# Patient Record
Sex: FEMALE | Race: WHITE | NOT HISPANIC OR LATINO | Employment: FULL TIME | URBAN - METROPOLITAN AREA
[De-identification: names, ages, dates, MRNs, and addresses within clinical notes are randomized per-mention and may not be internally consistent; named-entity substitution may affect disease eponyms.]

---

## 2019-08-05 ENCOUNTER — TRANSCRIBE ORDERS (OUTPATIENT)
Dept: ADMINISTRATIVE | Facility: HOSPITAL | Age: 59
End: 2019-08-05

## 2019-08-05 DIAGNOSIS — C48.0 MALIGNANT NEOPLASM OF RETROPERITONEUM (HCC): Primary | ICD-10-CM

## 2019-08-09 ENCOUNTER — APPOINTMENT (OUTPATIENT)
Dept: WOMENS IMAGING | Facility: HOSPITAL | Age: 59
End: 2019-08-09

## 2019-08-09 PROCEDURE — 77063 BREAST TOMOSYNTHESIS BI: CPT | Performed by: RADIOLOGY

## 2019-08-09 PROCEDURE — 77067 SCR MAMMO BI INCL CAD: CPT | Performed by: RADIOLOGY

## 2019-08-09 RX ORDER — CYCLOBENZAPRINE HCL 10 MG
10 TABLET ORAL 2 TIMES DAILY PRN
COMMUNITY
End: 2022-02-01 | Stop reason: ALTCHOICE

## 2019-08-09 RX ORDER — HYDROCHLOROTHIAZIDE 12.5 MG/1
12.5 TABLET ORAL DAILY
COMMUNITY
End: 2022-08-16 | Stop reason: SDUPTHER

## 2019-08-09 RX ORDER — TRAMADOL HYDROCHLORIDE 50 MG/1
50 TABLET ORAL EVERY 6 HOURS PRN
COMMUNITY
End: 2022-02-01 | Stop reason: ALTCHOICE

## 2019-08-09 RX ORDER — LISINOPRIL 40 MG/1
40 TABLET ORAL DAILY
COMMUNITY

## 2019-08-09 RX ORDER — ESTRADIOL 1 MG/1
1 TABLET ORAL DAILY
COMMUNITY
End: 2022-02-01 | Stop reason: DRUGHIGH

## 2019-08-12 ENCOUNTER — OFFICE VISIT (OUTPATIENT)
Dept: NEUROSURGERY | Facility: CLINIC | Age: 59
End: 2019-08-12

## 2019-08-12 VITALS
TEMPERATURE: 98.1 F | OXYGEN SATURATION: 95 % | WEIGHT: 251.6 LBS | HEART RATE: 73 BPM | HEIGHT: 65 IN | BODY MASS INDEX: 41.92 KG/M2

## 2019-08-12 DIAGNOSIS — M50.00 HNP (HERNIATED NUCLEUS PULPOSUS) WITH MYELOPATHY, CERVICAL: ICD-10-CM

## 2019-08-12 DIAGNOSIS — M48.061 SPINAL STENOSIS, LUMBAR REGION, WITHOUT NEUROGENIC CLAUDICATION: Primary | ICD-10-CM

## 2019-08-12 DIAGNOSIS — M43.10 ACQUIRED SPONDYLOLISTHESIS: ICD-10-CM

## 2019-08-12 PROCEDURE — 99243 OFF/OP CNSLTJ NEW/EST LOW 30: CPT | Performed by: NEUROLOGICAL SURGERY

## 2019-08-12 NOTE — PROGRESS NOTES
Subjective   Patient ID: Genie Lundberg is a 59 y.o. female is being seen for consultation today at the request of AIDEN Rodrigez  Chief Complaint: Neck and right arm pain, left lower back pain, left thigh numbness    History of Present Illness: The patient is a 59-year-old woman who is a  for  and has a history of the onset of right neck shoulder and arm pain extending to the right forearm on about 6 June.  That was treated with physical therapy and seemed to improve but then worsened again.  In the meantime she has had numbness of the left lateral thigh with a pricking or stinging kind of sensation, and she has had to sleep in a recliner for much of the past year because of this back discomfort which bothers her with standing and walking as well as with trying to recline or lie flat.  He denies help.  She is worsened by sitting in one place or standing in one place.    She was recently found to have a 15 cm infrarenal mass on the right side that is pending surgery.    Review of Radiographic Studies:  Lumbar MRI scan shows moderately severe stenosis L4-5 with facet joint widening on the right side and no gross spondylolisthesis but there is the probability of some nascent instability.  Cervical MRI scan shows a cervical foraminal disc herniation on the right at C4-5 with a moderate posterior disc osteophyte causing minimal stenosis, slight kyphotic angle with minimal stenosis in the canal at C5-6, but no evidence of right-sided foraminal stenosis.  Abdominal CT scan shows a large rounded right sided infrarenal mass elevating the kidney.    The following portions of the patient's history were reviewed, updated as appropriate and approved: allergies, current medications, past family history, past medical history, past social history, past surgical history, review of systems and problem list.  Review of Systems   Constitutional: Positive for activity change, appetite change and  fatigue.   Respiratory: Positive for apnea.    Musculoskeletal: Positive for arthralgias, back pain and neck pain.   Neurological: Positive for numbness.   All other systems reviewed and are negative.      Objective     NEUROLOGICAL EXAMINATION:      MENTAL STATUS:  Alert and oriented.  Speech intact.  Recent and remote memory intact.      CRANIAL NERVES:  Cranial nerve II:  Visual fields are full.  Cranial nerves III, IV and VI:  PERRLADC.  Extraocular movements are intact.  Nystagmus is not present.  Cranial nerve V:  Facial sensation is intact.  Cranial nerve VII:  Muscles of facial expression reveal no asymmetry.  Cranial nerve VIII:  Hearing is intact.  Cranial nerves IX and X:  Palate elevates symmetrically.  Cranial nerve XI:  Shoulder shrug is intact.  Cranial nerve XII:  Tongue is midline without evidence of atrophy or fasciculation.    MUSCULOSKELETAL: SLR negative bilaterally.    MOTOR: Intact strength in the upper and lower extremities without atrophy.    SENSATION: Numbness left lateral thigh    REFLEXES:  DTR trace biceps, triceps, patellar, and Achilles bilaterally.    Assessment   Foraminal stenosis C4-5 right with right C5 radiculopathy.  Mild stenosis and minimal kyphosis at C4-5 and C5-6.  Moderately severe stenosis L4-5 with probable early spondylolisthesis.       Plan   Physical therapy for the cervical and lumbar spine while she is having her abdominal infrarenal mass attended to surgically.  If symptoms persist after recovery from that surgery and adequate physical therapy, she should be seen again for discussion of injection therapies versus surgery.       Antonio Agudelo MD

## 2019-08-13 ENCOUNTER — APPOINTMENT (OUTPATIENT)
Dept: CT IMAGING | Facility: HOSPITAL | Age: 59
End: 2019-08-13

## 2020-10-06 ENCOUNTER — APPOINTMENT (OUTPATIENT)
Dept: WOMENS IMAGING | Facility: HOSPITAL | Age: 60
End: 2020-10-06

## 2020-10-06 PROCEDURE — 77067 SCR MAMMO BI INCL CAD: CPT | Performed by: RADIOLOGY

## 2020-10-06 PROCEDURE — 77063 BREAST TOMOSYNTHESIS BI: CPT | Performed by: RADIOLOGY

## 2021-11-15 ENCOUNTER — APPOINTMENT (OUTPATIENT)
Dept: WOMENS IMAGING | Facility: HOSPITAL | Age: 61
End: 2021-11-15

## 2021-11-15 PROCEDURE — 77067 SCR MAMMO BI INCL CAD: CPT | Performed by: RADIOLOGY

## 2021-11-15 PROCEDURE — 77063 BREAST TOMOSYNTHESIS BI: CPT | Performed by: RADIOLOGY

## 2022-02-01 ENCOUNTER — OFFICE VISIT (OUTPATIENT)
Dept: CARDIOLOGY | Facility: CLINIC | Age: 62
End: 2022-02-01

## 2022-02-01 VITALS
DIASTOLIC BLOOD PRESSURE: 80 MMHG | HEART RATE: 72 BPM | HEIGHT: 65 IN | TEMPERATURE: 98 F | OXYGEN SATURATION: 94 % | SYSTOLIC BLOOD PRESSURE: 118 MMHG | BODY MASS INDEX: 43.65 KG/M2 | WEIGHT: 262 LBS

## 2022-02-01 DIAGNOSIS — R09.02 HYPOXIA: Primary | ICD-10-CM

## 2022-02-01 DIAGNOSIS — E88.81 METABOLIC SYNDROME: ICD-10-CM

## 2022-02-01 DIAGNOSIS — I25.10 CORONARY ARTERY CALCIFICATION SEEN ON CAT SCAN: ICD-10-CM

## 2022-02-01 DIAGNOSIS — I25.6 SILENT MYOCARDIAL ISCHEMIA: ICD-10-CM

## 2022-02-01 DIAGNOSIS — G47.30 SLEEP APNEA IN ADULT: ICD-10-CM

## 2022-02-01 DIAGNOSIS — I10 PRIMARY HYPERTENSION: ICD-10-CM

## 2022-02-01 DIAGNOSIS — R53.83 OTHER FATIGUE: ICD-10-CM

## 2022-02-01 DIAGNOSIS — E78.00 HYPERCHOLESTEREMIA: ICD-10-CM

## 2022-02-01 DIAGNOSIS — C49.9 LIPOSARCOMA: ICD-10-CM

## 2022-02-01 PROBLEM — E88.810 METABOLIC SYNDROME: Status: ACTIVE | Noted: 2022-02-01

## 2022-02-01 PROCEDURE — 99204 OFFICE O/P NEW MOD 45 MIN: CPT | Performed by: INTERNAL MEDICINE

## 2022-02-01 PROCEDURE — 93000 ELECTROCARDIOGRAM COMPLETE: CPT | Performed by: INTERNAL MEDICINE

## 2022-02-01 RX ORDER — ERGOCALCIFEROL 1.25 MG/1
50000 CAPSULE ORAL WEEKLY
COMMUNITY

## 2022-02-01 RX ORDER — ESTRADIOL 0.5 MG/1
0.5 TABLET ORAL DAILY
COMMUNITY

## 2022-02-01 RX ORDER — ROSUVASTATIN CALCIUM 10 MG/1
10 TABLET, COATED ORAL DAILY
Qty: 30 TABLET | Refills: 11 | Status: SHIPPED | OUTPATIENT
Start: 2022-02-01 | End: 2022-12-19

## 2022-02-01 RX ORDER — CHLORAL HYDRATE 500 MG
CAPSULE ORAL
COMMUNITY

## 2022-02-01 RX ORDER — MULTIPLE VITAMINS W/ MINERALS TAB 9MG-400MCG
1 TAB ORAL DAILY
COMMUNITY

## 2022-02-01 RX ORDER — SENNOSIDES 8.6 MG
CAPSULE ORAL 4 TIMES DAILY PRN
COMMUNITY

## 2022-02-01 RX ORDER — ASPIRIN 81 MG/1
81 TABLET ORAL DAILY
COMMUNITY

## 2022-02-01 NOTE — PATIENT INSTRUCTIONS
Mediterranean Diet  A Mediterranean diet refers to food and lifestyle choices that are based on the traditions of countries located on the [place]. This way of eating has been shown to help prevent certain conditions and improve outcomes for people who have chronic diseases, like kidney disease and heart disease.  What are tips for following this plan?  Lifestyle  · Cook and eat meals together with your family, when possible.  · Drink enough fluid to keep your urine clear or pale yellow.  · Be physically active every day. This includes:  ? Aerobic exercise like running or swimming.  ? Leisure activities like gardening, walking, or housework.  · Get 7-8 hours of sleep each night.  · If recommended by your health care provider, drink red wine in moderation. This means 1 glass a day for nonpregnant women and 2 glasses a day for men. A glass of wine equals 5 oz (150 mL).  Reading food labels    · Check the serving size of packaged foods. For foods such as rice and pasta, the serving size refers to the amount of cooked product, not dry.  · Check the total fat in packaged foods. Avoid foods that have saturated fat or trans fats.  · Check the ingredients list for added sugars, such as corn syrup.    Shopping  · At the grocery store, buy most of your food from the areas near the walls of the store. This includes:  ? Fresh fruits and vegetables (produce).  ? Grains, beans, nuts, and seeds. Some of these may be available in unpackaged forms or large amounts (in bulk).  ? Fresh seafood.  ? Poultry and eggs.  ? Low-fat dairy products.  · Buy whole ingredients instead of prepackaged foods.  · Buy fresh fruits and vegetables in-season from local farmers markets.  · Buy frozen fruits and vegetables in resealable bags.  · If you do not have access to quality fresh seafood, buy precooked frozen shrimp or canned fish, such as tuna, salmon, or sardines.  · Buy small amounts of raw or cooked vegetables, salads, or olives from  the deli or salad bar at your store.  · Stock your pantry so you always have certain foods on hand, such as olive oil, canned tuna, canned tomatoes, rice, pasta, and beans.  Cooking  · Cook foods with extra-virgin olive oil instead of using butter or other vegetable oils.  · Have meat as a side dish, and have vegetables or grains as your main dish. This means having meat in small portions or adding small amounts of meat to foods like pasta or stew.  · Use beans or vegetables instead of meat in common dishes like chili or lasagna.  · [place] with different cooking methods. Try roasting or broiling vegetables instead of steaming or sautéeing them.  · Add frozen vegetables to soups, stews, pasta, or rice.  · Add nuts or seeds for added healthy fat at each meal. You can add these to yogurt, salads, or vegetable dishes.  · Marinate fish or vegetables using olive oil, lemon juice, garlic, and fresh herbs.  Meal planning    · Plan to eat 1 vegetarian meal one day each week. Try to work up to 2 vegetarian meals, if possible.  · Eat seafood 2 or more times a week.  · Have healthy snacks readily available, such as:  ? Vegetable sticks with hummus.  ? Greek yogurt.  ? Fruit and nut trail mix.  · Eat balanced meals throughout the week. This includes:  ? Fruit: 2-3 servings a day  ? Vegetables: 4-5 servings a day  ? Low-fat dairy: 2 servings a day  ? Fish, poultry, or lean meat: 1 serving a day  ? Beans and legumes: 2 or more servings a week  ? Nuts and seeds: 1-2 servings a day  ? Whole grains: 6-8 servings a day  ? Extra-virgin olive oil: 3-4 servings a day  · Limit red meat and sweets to only a few servings a month    What are my food choices?  · Mediterranean diet  ? Recommended  § Grains: Whole-grain pasta. Brown rice. Bulgar wheat. Polenta. Couscous. Whole-wheat bread. Oatmeal. Quinoa.  § Vegetables: Artichokes. Beets. Broccoli. Cabbage. Carrots. Eggplant. Green beans. Chard. Kale. Spinach. Onions. Leeks. Peas. Squash.  Tomatoes. Peppers. Radishes.  § Fruits: Apples. Apricots. Avocado. Berries. Bananas. Cherries. Dates. Figs. Grapes. Yolanda. Melon. Oranges. Peaches. Plums. Pomegranate.  § Meats and other protein foods: Beans. Almonds. Sunflower seeds. Pine nuts. Peanuts. Cod. Buna. Scallops. Shrimp. Tuna. Tilapia. Clams. Oysters. Eggs.  § Dairy: Low-fat milk. Cheese. Greek yogurt.  § Beverages: Water. Red wine. Herbal tea.  § Fats and oils: Extra virgin olive oil. Avocado oil. Grape seed oil.  § Sweets and desserts: Greek yogurt with honey. Baked apples. Poached pears. Trail mix.  § Seasoning and other foods: Basil. Cilantro. Coriander. Cumin. Mint. Parsley. [place]. Rosemary. Tarragon. Garlic. Oregano. Thyme. Pepper. Balsalmic vinegar. Tahini. Hummus. Tomato sauce. Olives. Mushrooms.  ? Limit these  § Grains: Prepackaged pasta or rice dishes. Prepackaged cereal with added sugar.  § Vegetables: Deep fried potatoes (french fries).  § Fruits: Fruit canned in syrup.  § Meats and other protein foods: Beef. Pork. Lamb. Poultry with skin. Hot dogs. [place]iles.  § Dairy: Ice cream. Sour cream. Whole milk.  § Beverages: Juice. Sugar-sweetened soft drinks. Beer. Liquor and spirits.  § Fats and oils: Butter. Canola oil. Vegetable oil. Beef fat (tallow). Lard.  § Sweets and desserts: Cookies. Cakes. Pies. Candy.  § Seasoning and other foods: Mayonnaise. Premade sauces and marinades.  The items listed may not be a complete list. Talk with your dietitian about what dietary choices are right for you.  Summary  · The Mediterranean diet includes both food and lifestyle choices.  · Eat a variety of fresh fruits and vegetables, beans, nuts, seeds, and whole grains.  · Limit the amount of red meat and sweets that you eat.  · Talk with your health care provider about whether it is safe for you to drink red wine in moderation. This means 1 glass a day for nonpregnant women and 2 glasses a day for men. A glass of wine equals 5 oz (150 mL).  This information  is not intended to replace advice given to you by your health care provider. Make sure you discuss any questions you have with your health care provider.  Document Revised: 08/17/2017 Document Reviewed: 08/10/2017  Elsevier Patient Education © 2020 [employer]

## 2022-02-01 NOTE — PROGRESS NOTES
Chief Complaint   Patient presents with   • Establish Care     PCP referred, O2 sat dropping down in the 80's   • Shortness of Breath     has felt SOB for the past several months, has always contributed to her weight and lack of exercise until she randomly started checking her pulse ox and it was in the low 80's. PCP added O2 at 2, sat still stayed around 89-91%.  O2 increased to 3L, will bring it up to 94-95%.   PCP ordered CT and chest Xray, CT in the door. No PFT or pulmonology consult yet.    • Labs     most recent labs from Nov in chart   • Cardiac history     cardiac testing 20+ years ago at your old office,  was told it was ok.         CARDIAC COMPLAINTS  dyspnea and fatigue      Subjective   Genie Lundberg is a 61 y.o. female came in today for her initial cardiac evaluation.  She has history of hypertension, metabolic syndrome has been noticing increasing shortness of breath for the last few months.  She also has been gaining weight.  She randomly check her pulse PO2 measurement and it has been running low at 80%.  She was put on 2 L oxygen and apparently the saturation went up to 91%.  One of her family members apparently is a respiratory therapist and they increase it to 3 L.  It is now better at around 95%.  She had a chest x-ray as well as a CT scan done.  According to the report the lungs appears to be hypoinflated.  Mild atelectatic changes seen.  She also has coronary artery atherosclerotic disease.  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  She has not been treated with chemotherapy or radiation.  She denies having any chest pain at this time.  She does not do much activities at this time her lab work showed normal thyroid function test.  Her A1c is upper limit of normal at 5.9.  Her blood count appears to be normal.  Her cholesterol is 180 with the LDL of 101.  She has no history of smoking or drinking alcohol.  She does have a family history of  heart disease.  Her brother had bypass surgery and a pacemaker placement.  Past Surgical History:   Procedure Laterality Date   • HYSTERECTOMY     • OTHER SURGICAL HISTORY  01/19/2022    CT Chest- Coronary artery calcification. Hypoinflated Lungs   • THYROIDECTOMY, PARTIAL      benign nodule removed       Current Outpatient Medications   Medication Sig Dispense Refill   • Acetaminophen (TYLENOL 8 HOUR ARTHRITIS PAIN PO) Take  by mouth 4 (Four) Times a Day As Needed.     • aspirin 81 MG EC tablet Take 81 mg by mouth Daily.     • Cyanocobalamin (VITAMIN B-12 IJ) Inject  as directed Every 30 (Thirty) Days.     • estradiol (ESTRACE) 0.5 MG tablet Take 0.5 mg by mouth Daily.     • hydrochlorothiazide (HYDRODIURIL) 12.5 MG tablet Take 12.5 mg by mouth Daily.     • lisinopril (PRINIVIL,ZESTRIL) 40 MG tablet Take 40 mg by mouth Daily.     • multivitamin with minerals (CENTRUM WOMEN PO) Take 1 tablet by mouth Daily.     • Omega-3 Fatty Acids (fish oil) 1000 MG capsule capsule Take  by mouth Daily With Breakfast.     • vitamin D (ERGOCALCIFEROL) 1.25 MG (23881 UT) capsule capsule Take 50,000 Units by mouth 1 (One) Time Per Week.     • rosuvastatin (CRESTOR) 10 MG tablet Take 1 tablet by mouth Daily. 30 tablet 11     No current facility-administered medications for this visit.           ALLERGIES:  Hydrocodone and Sulfa antibiotics    Past Medical History:   Diagnosis Date   • Cancer (HCC)     s/p surgery   • History of hysterectomy    • Hyperlipidemia     controlled with fish oil   • Hypertension    • Sleep apnea     compliant CPAP user   • Thyroid nodule     s/p removal, benign,1980   • Vitamin D deficiency        Social History     Tobacco Use   Smoking Status Never Smoker   Smokeless Tobacco Never Used          Family History   Problem Relation Age of Onset   • Hypertension Mother    • Diabetes Mother    • Thyroid disease Mother    • Arthritis Father    • Other Brother         PPM   • Heart disease Brother         CABG   •  "Hypertension Brother    • Diabetes Brother    • Stroke Maternal Grandmother    • Stroke Paternal Grandmother    • No Known Problems Daughter    • No Known Problems Daughter        Review of Systems   Constitutional: Positive for malaise/fatigue. Negative for decreased appetite.   HENT: Negative for congestion and sore throat.    Eyes: Negative for blurred vision.   Cardiovascular: Positive for dyspnea on exertion and palpitations. Negative for chest pain.   Respiratory: Positive for shortness of breath and snoring.    Endocrine: Negative for cold intolerance and heat intolerance.   Hematologic/Lymphatic: Negative for adenopathy. Does not bruise/bleed easily.   Skin: Negative for itching, nail changes and skin cancer.   Musculoskeletal: Negative for arthritis and myalgias.   Gastrointestinal: Negative for abdominal pain, dysphagia and heartburn.   Genitourinary: Negative for bladder incontinence and frequency.   Neurological: Negative for dizziness, light-headedness, seizures and vertigo.   Psychiatric/Behavioral: Negative for altered mental status.   Allergic/Immunologic: Negative for environmental allergies and hives.       Diabetes- No  Thyroid- normal    Objective     /80 (BP Location: Left arm)   Pulse 72   Temp 98 °F (36.7 °C)   Ht 165.1 cm (65\")   Wt 119 kg (262 lb)   SpO2 94% Comment: on room air  BMI 43.60 kg/m²     Vitals and nursing note reviewed.   Constitutional:       Appearance: Healthy appearance. Not in distress.   Eyes:      Conjunctiva/sclera: Conjunctivae normal.      Pupils: Pupils are equal, round, and reactive to light.   HENT:      Head: Normocephalic.   Pulmonary:      Effort: Pulmonary effort is normal.      Breath sounds: Examination of the right-middle field reveals decreased breath sounds. Examination of the left-middle field reveals decreased breath sounds. Examination of the right-lower field reveals decreased breath sounds. Examination of the left-lower field reveals " decreased breath sounds. Decreased breath sounds present.   Cardiovascular:      PMI at left midclavicular line. Normal rate. Regular rhythm.      Murmurs: There is a systolic murmur.   Abdominal:      General: Bowel sounds are normal.      Palpations: Abdomen is soft.   Musculoskeletal: Normal range of motion.      Cervical back: Normal range of motion and neck supple. Skin:     General: Skin is warm and dry.   Neurological:      Mental Status: Alert, oriented to person, place, and time and oriented to person, place and time.           ECG 12 Lead    Date/Time: 2/1/2022 1:34 PM  Performed by: Marcus Mcadams MD  Authorized by: Marcus Mcadams MD   Previous ECG: no previous ECG available  Rhythm: sinus rhythm  Rate: normal  Q waves: V6, V5 and aVL    QRS axis: normal  Other findings: low voltage    Clinical impression: abnormal EKG              Assessment/Plan   Patient's Body mass index is 43.6 kg/m². indicating that she is morbidly obese (BMI > 40 or > 35 with obesity - related health condition). Obesity-related health conditions include the following: obstructive sleep apnea, hypertension, coronary heart disease and dyslipidemias. Obesity is newly identified. BMI is is above average; BMI management plan is completed. We discussed low calorie, low carb based diet program, portion control and increasing exercise..     Diagnoses and all orders for this visit:    1. Hypoxia (Primary)  -     Adult Transthoracic Echo Complete W/ Cont if Necessary Per Protocol; Future    2. Primary hypertension    3. Other fatigue    4. Hypercholesteremia  -     rosuvastatin (CRESTOR) 10 MG tablet; Take 1 tablet by mouth Daily.  Dispense: 30 tablet; Refill: 11    5. Sleep apnea in adult  -     Overnight Sleep Oximetry Study; Future    6. Liposarcoma (HCC)    7. Coronary artery calcification seen on CAT scan  -     Stress Test With Myocardial Perfusion One Day; Future  -     Adult Transthoracic Echo Complete W/ Cont if  Necessary Per Protocol; Future    8. Silent myocardial ischemia  -     Stress Test With Myocardial Perfusion One Day; Future    9. Metabolic syndrome    At baseline her heart rate is stable.  Her blood pressure is normal.  Her EKG showed sinus rhythm with Q waves in the lateral leads.  Her clinical examination reveals a BMI of 44.  She does have a slightly loud second heart sound and a short systolic murmur at the mitral area.    Regarding the hypoxia, I checked O2 saturation while she was here at the office it appears to be around 94 without any supplemental oxygen.  It seems most of her hypoxia occurs at nighttime.  I scheduled her to undergo an echocardiogram to evaluate the LV function and also do a bubble study to rule out a shunt causing some of the hypoxia.    Regarding her symptoms of feeling tired and fatigue, it could be secondary to sleep apnea.  She does use a CPAP but it has not been checked for a long time.  Like to check her nocturnal pulse PO2 measurement and if there is evidence of hypoxia, she may need to see a pulmonologist and may need to titrate her CPAP or change to BiPAP.    Regarding her hypercholesterolemia, given the fact that she does have coronary artery calcification, the likelihood of CAD is high.  At this time I started her on Crestor at 10 mg once a day.  I scheduled her to undergo a stress test in the form of Lexiscan to rule out silent ischemia.    Regarding her history of liposarcoma, she has undergone surgery and apparently has been followed couple of times with the surgeon.  He feels that it is taken care of so we will continue to monitor it    Regarding the metabolic syndrome, I talked to her about diet and weight reduction.  I gave her papers on Mediterranean diet.  I also talked to her about intermittent fasting diet.    Based on the results, further recommendations will be made.               Electronically signed by Marcus Mcadams MD February 1, 2022 13:33 EST

## 2022-02-07 NOTE — PROGRESS NOTES
Pt aware of results of overnight, she has already discussed with PCP, she said she did get up in the night and take off her pulse ox when her O2 was at 84%. PCP is considering repeating.

## 2022-03-08 ENCOUNTER — HOSPITAL ENCOUNTER (OUTPATIENT)
Dept: CARDIOLOGY | Facility: HOSPITAL | Age: 62
Discharge: HOME | End: 2022-03-08

## 2022-03-08 DIAGNOSIS — I25.6 SILENT MYOCARDIAL ISCHEMIA: ICD-10-CM

## 2022-03-08 DIAGNOSIS — I25.10 CORONARY ARTERY CALCIFICATION SEEN ON CAT SCAN: ICD-10-CM

## 2022-03-08 DIAGNOSIS — R09.02 HYPOXIA: ICD-10-CM

## 2022-03-08 LAB
BH CV ECHO MEAS - ACS: 2.1 CM
BH CV ECHO MEAS - AO MAX PG: 5 MMHG
BH CV ECHO MEAS - AO MEAN PG: 3 MMHG
BH CV ECHO MEAS - AO ROOT AREA (BSA CORRECTED): 1.3
BH CV ECHO MEAS - AO ROOT AREA: 6.2 CM^2
BH CV ECHO MEAS - AO ROOT DIAM: 2.8 CM
BH CV ECHO MEAS - AO V2 MAX: 112 CM/SEC
BH CV ECHO MEAS - AO V2 MEAN: 82 CM/SEC
BH CV ECHO MEAS - AO V2 VTI: 23.6 CM
BH CV ECHO MEAS - BSA(HAYCOCK): 2.4 M^2
BH CV ECHO MEAS - BSA: 2.2 M^2
BH CV ECHO MEAS - BZI_BMI: 43.6 KILOGRAMS/M^2
BH CV ECHO MEAS - BZI_METRIC_HEIGHT: 165.1 CM
BH CV ECHO MEAS - BZI_METRIC_WEIGHT: 118.8 KG
BH CV ECHO MEAS - EDV(CUBED): 49 ML
BH CV ECHO MEAS - EDV(MOD-SP4): 60.4 ML
BH CV ECHO MEAS - EDV(TEICH): 56.6 ML
BH CV ECHO MEAS - EF(CUBED): 79.4 %
BH CV ECHO MEAS - EF(MOD-SP4): 64.1 %
BH CV ECHO MEAS - EF(TEICH): 72.7 %
BH CV ECHO MEAS - EF_3D-VOL: 61 %
BH CV ECHO MEAS - ESV(CUBED): 10.1 ML
BH CV ECHO MEAS - ESV(MOD-SP4): 21.7 ML
BH CV ECHO MEAS - ESV(TEICH): 15.5 ML
BH CV ECHO MEAS - FS: 41 %
BH CV ECHO MEAS - IVS/LVPW: 1.1
BH CV ECHO MEAS - IVSD: 1.4 CM
BH CV ECHO MEAS - LA DIMENSION: 3.4 CM
BH CV ECHO MEAS - LA/AO: 1.2
BH CV ECHO MEAS - LV DIASTOLIC VOL/BSA (35-75): 27.2 ML/M^2
BH CV ECHO MEAS - LV IVRT: 0.14 SEC
BH CV ECHO MEAS - LV MASS(C)D: 162.9 GRAMS
BH CV ECHO MEAS - LV MASS(C)DI: 73.4 GRAMS/M^2
BH CV ECHO MEAS - LV SYSTOLIC VOL/BSA (12-30): 9.8 ML/M^2
BH CV ECHO MEAS - LVIDD: 3.7 CM
BH CV ECHO MEAS - LVIDS: 2.2 CM
BH CV ECHO MEAS - LVLD AP4: 6.4 CM
BH CV ECHO MEAS - LVLS AP4: 5.1 CM
BH CV ECHO MEAS - LVOT AREA (M): 3.5 CM^2
BH CV ECHO MEAS - LVOT AREA: 3.5 CM^2
BH CV ECHO MEAS - LVOT DIAM: 2.1 CM
BH CV ECHO MEAS - LVPWD: 1.2 CM
BH CV ECHO MEAS - MV A MAX VEL: 86.5 CM/SEC
BH CV ECHO MEAS - MV DEC SLOPE: 278 CM/SEC^2
BH CV ECHO MEAS - MV E MAX VEL: 66 CM/SEC
BH CV ECHO MEAS - MV E/A: 0.76
BH CV ECHO MEAS - RVDD: 3.1 CM
BH CV ECHO MEAS - SI(AO): 65.5 ML/M^2
BH CV ECHO MEAS - SI(CUBED): 17.6 ML/M^2
BH CV ECHO MEAS - SI(MOD-SP4): 17.4 ML/M^2
BH CV ECHO MEAS - SI(TEICH): 18.6 ML/M^2
BH CV ECHO MEAS - SV(AO): 145.3 ML
BH CV ECHO MEAS - SV(CUBED): 39 ML
BH CV ECHO MEAS - SV(MOD-SP4): 38.7 ML
BH CV ECHO MEAS - SV(TEICH): 41.2 ML
BH CV REST NUCLEAR ISOTOPE DOSE: 10 MCI
BH CV STRESS COMMENTS STAGE 1: NORMAL
BH CV STRESS DOSE REGADENOSON STAGE 1: 0.4
BH CV STRESS DURATION MIN STAGE 1: 0
BH CV STRESS DURATION SEC STAGE 1: 10
BH CV STRESS NUCLEAR ISOTOPE DOSE: 30 MCI
BH CV STRESS PROTOCOL 1: NORMAL
BH CV STRESS RECOVERY BP: NORMAL MMHG
BH CV STRESS RECOVERY HR: 78 BPM
BH CV STRESS STAGE 1: 1
LV EF NUC BP: 71 %
MAXIMAL PREDICTED HEART RATE: 160 BPM
MAXIMAL PREDICTED HEART RATE: 160 BPM
PERCENT MAX PREDICTED HR: 53.13 %
STRESS BASELINE BP: NORMAL MMHG
STRESS BASELINE HR: 65 BPM
STRESS PERCENT HR: 63 %
STRESS POST PEAK BP: NORMAL MMHG
STRESS POST PEAK HR: 85 BPM
STRESS TARGET HR: 136 BPM
STRESS TARGET HR: 136 BPM

## 2022-03-08 PROCEDURE — 93017 CV STRESS TEST TRACING ONLY: CPT

## 2022-03-08 PROCEDURE — 78452 HT MUSCLE IMAGE SPECT MULT: CPT

## 2022-03-08 PROCEDURE — 93306 TTE W/DOPPLER COMPLETE: CPT | Performed by: INTERNAL MEDICINE

## 2022-03-08 PROCEDURE — 78452 HT MUSCLE IMAGE SPECT MULT: CPT | Performed by: INTERNAL MEDICINE

## 2022-03-08 PROCEDURE — 25010000002 REGADENOSON 0.4 MG/5ML SOLUTION: Performed by: INTERNAL MEDICINE

## 2022-03-08 PROCEDURE — A9500 TC99M SESTAMIBI: HCPCS | Performed by: INTERNAL MEDICINE

## 2022-03-08 PROCEDURE — 93018 CV STRESS TEST I&R ONLY: CPT | Performed by: INTERNAL MEDICINE

## 2022-03-08 PROCEDURE — 0 TECHNETIUM SESTAMIBI: Performed by: INTERNAL MEDICINE

## 2022-03-08 PROCEDURE — 93306 TTE W/DOPPLER COMPLETE: CPT

## 2022-03-08 RX ORDER — SODIUM CHLORIDE 9 MG/ML
8 INJECTION INTRAMUSCULAR; INTRAVENOUS; SUBCUTANEOUS ONCE AS NEEDED
Status: COMPLETED | OUTPATIENT
Start: 2022-03-08 | End: 2022-03-08

## 2022-03-08 RX ADMIN — TECHNETIUM TC 99M SESTAMIBI 1 DOSE: 1 INJECTION INTRAVENOUS at 08:27

## 2022-03-08 RX ADMIN — SODIUM CHLORIDE 8 ML: 9 INJECTION, SOLUTION INTRAMUSCULAR; INTRAVENOUS; SUBCUTANEOUS at 12:27

## 2022-03-08 RX ADMIN — REGADENOSON 0.4 MG: 0.08 INJECTION, SOLUTION INTRAVENOUS at 11:49

## 2022-03-08 RX ADMIN — TECHNETIUM TC 99M SESTAMIBI 1 DOSE: 1 INJECTION INTRAVENOUS at 11:49

## 2022-08-16 ENCOUNTER — OFFICE VISIT (OUTPATIENT)
Dept: CARDIOLOGY | Facility: CLINIC | Age: 62
End: 2022-08-16

## 2022-08-16 ENCOUNTER — OFFICE VISIT (OUTPATIENT)
Dept: PULMONOLOGY | Facility: CLINIC | Age: 62
End: 2022-08-16

## 2022-08-16 VITALS
BODY MASS INDEX: 44.15 KG/M2 | HEART RATE: 71 BPM | DIASTOLIC BLOOD PRESSURE: 82 MMHG | SYSTOLIC BLOOD PRESSURE: 128 MMHG | HEIGHT: 65 IN | OXYGEN SATURATION: 96 % | WEIGHT: 265 LBS | TEMPERATURE: 97.7 F

## 2022-08-16 VITALS
HEART RATE: 72 BPM | DIASTOLIC BLOOD PRESSURE: 72 MMHG | WEIGHT: 270 LBS | HEIGHT: 65 IN | SYSTOLIC BLOOD PRESSURE: 114 MMHG | BODY MASS INDEX: 44.98 KG/M2

## 2022-08-16 DIAGNOSIS — E78.00 HYPERCHOLESTEREMIA: ICD-10-CM

## 2022-08-16 DIAGNOSIS — R06.02 SHORTNESS OF BREATH: Primary | ICD-10-CM

## 2022-08-16 DIAGNOSIS — I25.10 CORONARY ARTERY CALCIFICATION SEEN ON CAT SCAN: ICD-10-CM

## 2022-08-16 DIAGNOSIS — E88.81 METABOLIC SYNDROME: ICD-10-CM

## 2022-08-16 DIAGNOSIS — I10 PRIMARY HYPERTENSION: ICD-10-CM

## 2022-08-16 DIAGNOSIS — I25.6 SILENT MYOCARDIAL ISCHEMIA: ICD-10-CM

## 2022-08-16 DIAGNOSIS — G47.33 OSA (OBSTRUCTIVE SLEEP APNEA): ICD-10-CM

## 2022-08-16 DIAGNOSIS — E66.01 MORBID OBESITY WITH BMI OF 40.0-44.9, ADULT: ICD-10-CM

## 2022-08-16 DIAGNOSIS — R09.02 HYPOXIA: Primary | ICD-10-CM

## 2022-08-16 PROCEDURE — 99204 OFFICE O/P NEW MOD 45 MIN: CPT | Performed by: NURSE PRACTITIONER

## 2022-08-16 PROCEDURE — 99214 OFFICE O/P EST MOD 30 MIN: CPT | Performed by: INTERNAL MEDICINE

## 2022-08-16 RX ORDER — NITROGLYCERIN 0.4 MG/1
TABLET SUBLINGUAL
Qty: 100 TABLET | Refills: 11 | Status: SHIPPED | OUTPATIENT
Start: 2022-08-16

## 2022-08-16 RX ORDER — ALBUTEROL SULFATE 90 UG/1
2 AEROSOL, METERED RESPIRATORY (INHALATION) EVERY 4 HOURS PRN
Qty: 18 G | Refills: 11 | Status: SHIPPED | OUTPATIENT
Start: 2022-08-16

## 2022-08-16 RX ORDER — LEVOCETIRIZINE DIHYDROCHLORIDE 5 MG/1
5 TABLET, FILM COATED ORAL EVERY EVENING
COMMUNITY

## 2022-08-16 RX ORDER — HYDROCHLOROTHIAZIDE 12.5 MG/1
12.5 CAPSULE, GELATIN COATED ORAL DAILY
COMMUNITY
Start: 2022-07-21

## 2022-08-16 NOTE — PROGRESS NOTES
"Chief Complaint  Sleep Apnea and Shortness of Breath    Subjective        Genie Lundberg presents to [facility] PULMONARY & CRITICAL CARE MEDICINE  History of Present Illness     Ms. Lundberg is a 62 year old female with a medical history significant for arthritis, hyperlipidemia, hypertension, and sleep apnea.    She presents today for evaluation of shortness of breath.  She states that she has had shortness of breath since earlier this year after having COVID.  She states that her shortness of breath is worse at rest and improves when she is moving.  She states that she has noticed that her oxygen saturation has been between 88-90%.  She reports an occasional productive cough.  She tells me that she uses Flonase and other medications occasionally.  She tells me that she was very sick with COVID but did not end up in the hospital as her daughters are a Respiratory therapist and Nurse practitioner and they took care of her at home.  Her last chest xray was in January when she had COVID.  She tells me that she she uses her cpap nightly.  She is a life long non smoker.      Objective   Vital Signs:  /82 (BP Location: Left arm, Patient Position: Sitting)   Pulse 71   Temp 97.7 °F (36.5 °C)   Ht 165.1 cm (65\")   Wt 120 kg (265 lb)   SpO2 96%   BMI 44.10 kg/m²   Estimated body mass index is 44.1 kg/m² as calculated from the following:    Height as of this encounter: 165.1 cm (65\").    Weight as of this encounter: 120 kg (265 lb).    Class 3 Severe Obesity (BMI >=40). Obesity-related health conditions include the following: obstructive sleep apnea, hypertension and dyslipidemias. Obesity is unchanged. BMI is is above average; BMI management plan is completed. We discussed portion control and increasing exercise.      Physical Exam     GENERAL APPEARANCE: Well developed, well nourished, alert and cooperative, and appears to be in no acute distress.    HEAD: normocephalic. Atraumatic.    EYES: PERRL, " EOMI. Vision is grossly intact.    THROAT: Oral cavity and pharynx normal. No inflammation, swelling, exudate, or lesions.     NECK: Neck supple.  No thyromegaly.    CARDIAC: Normal S1 and S2. No S3, S4 or murmurs. Rhythm is regular.     RESPIRATORY:Bilateral air entry positive. Bilateral diminished breath sounds. No wheezing, crackles or rhonchi noted.    GI: Positive bowel sounds. Soft, nondistended, nontender.     MUSCULOSKELETAL: No significant deformity or joint abnormality. No edema. Peripheral pulses intact. No varicosities.    NEUROLOGICAL: Strength and sensation symmetric and intact throughout.     PSYCHIATRIC: The mental examination revealed the patient was oriented to person, place, and time.     Result Review :  The following data was reviewed by: AIDEN Browning on 08/16/2022:               Assessment and Plan   Diagnoses and all orders for this visit:    1. Shortness of breath (Primary)  -     Full Pulmonary Function Test With Bronchodilator; Future  -     XR Chest 2 View; Future    2. SUZI (obstructive sleep apnea)    3. Morbid obesity with BMI of 40.0-44.9, adult (HCC)    Other orders  -     albuterol sulfate  (90 Base) MCG/ACT inhaler; Inhale 2 puffs Every 4 (Four) Hours As Needed for Wheezing.  Dispense: 18 g; Refill: 11           Ordered PFT to assess lung function.  Ordered chest xray.  Will start her on an albuterol inhaler to use as needed.    Her shortness of breath is likely related to COVID.      She is compliant with her cpap nightly.     Patient was educated on positive airway pressure treatment.  As per CMS guidelines, more than 4 hours on 70% of observed nights is considered adherence. Patient was strongly encouraged to use CPAP as much as possible during sleep as more CPAP use is equal to more benefit. Use of heated humidification in positive airway pressure treatment to improve the adherence to the device.  In case of claustrophobia, we will provide the patient  cognitive behavioral therapy and desensitization. Oral appliances use will be discussed with the patient in case of mild to moderate sleep apnea or if the patient with severe disease fail positive airway pressure treatment.       The patient was extensively educated on the consequences of untreated obstructive sleep apnea namely cardiovascular/metabolic disorder, neurocognitive deficit, daytime sleepiness, motor vehicle accidents, depression, mood disorders and reduced quality of life.  At the end of conversation, the patient voices understanding of the disease process and treatment modality.  Patient also understands the risk of untreated obstructive sleep apnea and benefit benefits of the treatment.    Counseling time was greater than 10 minutes.        Follow Up   Return in about 3 months (around 11/16/2022).  Patient was given instructions and counseling regarding her condition or for health maintenance advice. Please see specific information pulled into the AVS if appropriate.

## 2022-08-16 NOTE — PROGRESS NOTES
Chief Complaint   Patient presents with   • Follow-up     For cardiac management     • Shortness of Breath     Saw pulmonology in [facility] this morning. Scheduled for PFT. O2 sat running around 88-94% sitting, when up doing something it will go above 95%.    • Labs     PCP checked in July.    • Back Pain     Having back and leg pain, has had an MRI to see neuro soon.        CARDIAC COMPLAINTS  dyspnea        Subjective   Genie Lundberg is a 62 y.o. female came in today for her follow-up visit.  She has history of hypertension, hypercholesterolemia who also has history of COVID infection almost 2 years ago.  She has been noticing intermittent hypoxia and was referred for further management.  She does have family history of heart disease.  Her CT scan showed calcification of the coronary arteries.  She underwent an echocardiogram with bubble study and showed no evidence of shunt and the LV function was normal her stress test with Lexiscan showed normal LV function and possible small apical ischemia.  She was advised to be on aspirin and a statin.  She has seen pulmonologist and will be getting PFT soon.  She still has some shortness of breath.  Her hypoxia is little better.  She has been having a lot of back and leg pain.  MRI of the cervical spine and lumbar spine showed significant narrowing.  She denies having any chest pain at this time              Cardiac History  Past Surgical History:   Procedure Laterality Date   • CARDIOVASCULAR STRESS TEST  03/08/2022    Lexiscan- EF > 70%. Small apical Ischemia   • ECHO - CONVERTED  03/08/2022    EF 65%. Trace MR & AI. No Shunt   • HYSTERECTOMY     • OTHER SURGICAL HISTORY  01/19/2022    CT Chest- Coronary artery calcification. Hypoinflated Lungs   • OTHER SURGICAL HISTORY  02/04/2022    Pulse O2- No sig hypoxia   • THYROIDECTOMY, PARTIAL      benign nodule removed       Current Outpatient Medications   Medication Sig Dispense Refill   • acetaminophen (TYLENOL) 650 MG 8 hr  tablet Take  by mouth 4 (Four) Times a Day As Needed.     • albuterol sulfate  (90 Base) MCG/ACT inhaler Inhale 2 puffs Every 4 (Four) Hours As Needed for Wheezing. 18 g 11   • aspirin 81 MG EC tablet Take 81 mg by mouth Daily.     • Cyanocobalamin (VITAMIN B-12 IJ) Inject  as directed Every 30 (Thirty) Days.     • estradiol (ESTRACE) 0.5 MG tablet Take 0.5 mg by mouth Daily.     • fluticasone (VERAMYST) 27.5 MCG/SPRAY nasal spray 2 sprays into the nostril(s) as directed by provider Daily.     • hydroCHLOROthiazide (MICROZIDE) 12.5 MG capsule Take 12.5 mg by mouth Daily.     • levocetirizine (XYZAL) 5 MG tablet Take 5 mg by mouth Every Evening.     • lisinopril (PRINIVIL,ZESTRIL) 40 MG tablet Take 40 mg by mouth Daily.     • multivitamin with minerals tablet tablet Take 1 tablet by mouth Daily.     • Omega-3 Fatty Acids (fish oil) 1000 MG capsule capsule Take  by mouth Daily With Breakfast.     • rosuvastatin (CRESTOR) 10 MG tablet Take 1 tablet by mouth Daily. 30 tablet 11   • vitamin D (ERGOCALCIFEROL) 1.25 MG (53035 UT) capsule capsule Take 50,000 Units by mouth 1 (One) Time Per Week.     • nitroglycerin (NITROSTAT) 0.4 MG SL tablet 1 under the tongue as needed for angina, may repeat q5mins for up three doses 100 tablet 11     No current facility-administered medications for this visit.       Allergies  :  Hydrocodone and Sulfa antibiotics       Past Medical History:   Diagnosis Date   • Arthritis    • Borderline diabetic    • Cancer (HCC)     s/p surgery   • History of hysterectomy    • Hyperlipidemia     controlled with fish oil   • Hypertension    • Sleep apnea     compliant CPAP user   • Thyroid nodule     s/p removal, benign,1980   • Vitamin D deficiency        Social History     Socioeconomic History   • Marital status:    Tobacco Use   • Smoking status: Never Smoker   • Smokeless tobacco: Never Used   Vaping Use   • Vaping Use: Never used   Substance and Sexual Activity   • Alcohol use: No  "  • Drug use: No   • Sexual activity: Defer       Family History   Problem Relation Age of Onset   • Hypertension Mother    • Diabetes Mother    • Thyroid disease Mother    • Arthritis Father    • Other Brother         PPM   • Heart disease Brother         CABG   • Hypertension Brother    • Diabetes Brother    • Stroke Maternal Grandmother    • Stroke Paternal Grandmother    • No Known Problems Daughter    • No Known Problems Daughter        Review of Systems   Constitutional: Positive for malaise/fatigue. Negative for decreased appetite.   HENT: Negative for congestion and sore throat.    Eyes: Negative for blurred vision.   Cardiovascular: Positive for dyspnea on exertion. Negative for chest pain.   Respiratory: Negative for shortness of breath and snoring.    Endocrine: Negative for cold intolerance and heat intolerance.   Hematologic/Lymphatic: Negative for adenopathy. Does not bruise/bleed easily.   Skin: Negative for itching, nail changes and skin cancer.   Musculoskeletal: Positive for back pain. Negative for arthritis and myalgias.   Gastrointestinal: Negative for abdominal pain, dysphagia and heartburn.   Genitourinary: Negative for bladder incontinence and frequency.   Neurological: Negative for dizziness, light-headedness, seizures and vertigo.   Psychiatric/Behavioral: Negative for altered mental status.   Allergic/Immunologic: Negative for environmental allergies and hives.       Diabetes- No  Thyroid- normal    Objective     /72   Pulse 72   Ht 165.1 cm (65\")   Wt 122 kg (270 lb)   BMI 44.93 kg/m²     Vitals and nursing note reviewed.   Constitutional:       Appearance: Healthy appearance. Not in distress.   Eyes:      Conjunctiva/sclera: Conjunctivae normal.      Pupils: Pupils are equal, round, and reactive to light.   HENT:      Head: Normocephalic.   Pulmonary:      Effort: Pulmonary effort is normal.      Breath sounds: Normal breath sounds.   Cardiovascular:      PMI at left " midclavicular line. Normal rate. Regular rhythm.      Murmurs: There is a systolic murmur.   Abdominal:      General: Bowel sounds are normal.      Palpations: Abdomen is soft.   Musculoskeletal: Normal range of motion.      Cervical back: Normal range of motion and neck supple. Skin:     General: Skin is warm and dry.   Neurological:      Mental Status: Alert, oriented to person, place, and time and oriented to person, place and time.       Procedures            @ASSESSMENT/PLAN@  Class 3 Severe Obesity (BMI >=40). Obesity-related health conditions include the following: hypertension, dyslipidemias and lower extremity venous stasis disease. Obesity is unchanged. BMI is is above average; BMI management plan is completed. We discussed low calorie, low carb based diet program, portion control and increasing exercise.     Diagnoses and all orders for this visit:    1. Hypoxia (Primary)    2. Coronary artery calcification seen on CAT scan  -     nitroglycerin (NITROSTAT) 0.4 MG SL tablet; 1 under the tongue as needed for angina, may repeat q5mins for up three doses  Dispense: 100 tablet; Refill: 11    3. Silent myocardial ischemia  -     nitroglycerin (NITROSTAT) 0.4 MG SL tablet; 1 under the tongue as needed for angina, may repeat q5mins for up three doses  Dispense: 100 tablet; Refill: 11    4. Primary hypertension    5. Hypercholesteremia    6. Metabolic syndrome       At baseline her heart rate and blood pressure appear stable.  Her BMI is still around 45.  Her cardiovascular examination is essentially normal    Regarding her hypoxia, it could be secondary to her COVID-pneumonia in the past with scar tissue.  She is now being followed by the pulmonologist.    Regarding the CT evidence of coronary artery calcification and the possibility of silent ischemia was discussed with her and her  in detail.  At this time I gave her a prescription for sublingual nitroglycerin to be used as needed.  I also explained to  her that if she gets extremely short winded and try sublingual nitroglycerin.  If there is improvement then she may need to undergo elective cardiac catheterization.    Regarding the hypertension, it seems to be controlled with lisinopril.  Continue that along with Microzide    Regarding hypercholesterolemia, she is on Crestor.  Continue the same.  She need her lipids checked along with the LFT    Regarding metabolic syndrome, talked to her again about cutting down on the carbohydrate intake    Overall cardiac status at this time appears to be stable.  I will see her back in 6 months or sooner if needed.                  Electronically signed by Marcus Mcadams MD August 16, 2022 15:32 EDT

## 2022-08-19 ENCOUNTER — PATIENT ROUNDING (BHMG ONLY) (OUTPATIENT)
Dept: PULMONOLOGY | Facility: CLINIC | Age: 62
End: 2022-08-19

## 2022-08-19 NOTE — PROGRESS NOTES
August 19, 2022    Hello, may I speak with Genie Lundberg?    My name is Michelle       I am  with [facility] PULMONARY & CRITICAL CARE MEDICINE  [address]  [place] [address] [phone].    Before we get started may I verify your date of birth? 1960    I am calling to officially welcome you to our practice and ask about your recent visit. Is this a good time to talk? yes    Tell me about your visit with us. What things went well?  She was very thorough. Everyone in the office was very nice and kind from the front office to the time I left.         We're always looking for ways to make our patients' experiences even better. Do you have recommendations on ways we may improve?  no    Overall were you satisfied with your first visit to our practice? yes       I appreciate you taking the time to speak with me today. Is there anything else I can do for you? no      Thank you, and have a great day.

## 2022-08-31 ENCOUNTER — HOSPITAL ENCOUNTER (OUTPATIENT)
Dept: RESPIRATORY THERAPY | Facility: HOSPITAL | Age: 62
Discharge: HOME | End: 2022-08-31

## 2022-08-31 ENCOUNTER — HOSPITAL ENCOUNTER (OUTPATIENT)
Dept: GENERAL RADIOLOGY | Facility: HOSPITAL | Age: 62
Discharge: HOME | End: 2022-08-31

## 2022-08-31 VITALS — OXYGEN SATURATION: 98 % | HEART RATE: 68 BPM | RESPIRATION RATE: 18 BRPM

## 2022-08-31 DIAGNOSIS — R06.02 SHORTNESS OF BREATH: ICD-10-CM

## 2022-08-31 PROCEDURE — 94726 PLETHYSMOGRAPHY LUNG VOLUMES: CPT | Performed by: INTERNAL MEDICINE

## 2022-08-31 PROCEDURE — 94726 PLETHYSMOGRAPHY LUNG VOLUMES: CPT

## 2022-08-31 PROCEDURE — 71046 X-RAY EXAM CHEST 2 VIEWS: CPT | Performed by: RADIOLOGY

## 2022-08-31 PROCEDURE — 94640 AIRWAY INHALATION TREATMENT: CPT

## 2022-08-31 PROCEDURE — 94760 N-INVAS EAR/PLS OXIMETRY 1: CPT

## 2022-08-31 PROCEDURE — 94060 EVALUATION OF WHEEZING: CPT

## 2022-08-31 PROCEDURE — 71046 X-RAY EXAM CHEST 2 VIEWS: CPT

## 2022-08-31 PROCEDURE — 94729 DIFFUSING CAPACITY: CPT | Performed by: INTERNAL MEDICINE

## 2022-08-31 PROCEDURE — 94729 DIFFUSING CAPACITY: CPT

## 2022-08-31 PROCEDURE — 94060 EVALUATION OF WHEEZING: CPT | Performed by: INTERNAL MEDICINE

## 2022-08-31 PROCEDURE — 94664 DEMO&/EVAL PT USE INHALER: CPT

## 2022-08-31 RX ORDER — ALBUTEROL SULFATE 2.5 MG/3ML
2.5 SOLUTION RESPIRATORY (INHALATION) ONCE
Status: COMPLETED | OUTPATIENT
Start: 2022-08-31 | End: 2022-08-31

## 2022-08-31 RX ADMIN — ALBUTEROL SULFATE 2.5 MG: 2.5 SOLUTION RESPIRATORY (INHALATION) at 14:56

## 2022-09-01 ENCOUNTER — TELEPHONE (OUTPATIENT)
Dept: PULMONOLOGY | Facility: CLINIC | Age: 62
End: 2022-09-01

## 2022-09-01 NOTE — TELEPHONE ENCOUNTER
----- Message from AIDEN Browning sent at 9/1/2022 11:03 AM EDT -----  Will you let her know that her Chest xray was normal.    ----- Message -----  From: [facility] [place] [facility]  Sent: 8/31/2022   3:48 PM EDT  To: AIDEN Browning

## 2022-09-07 ENCOUNTER — TELEPHONE (OUTPATIENT)
Dept: PULMONOLOGY | Facility: CLINIC | Age: 62
End: 2022-09-07

## 2022-09-07 NOTE — TELEPHONE ENCOUNTER
----- Message from AIDEN Browning sent at 9/7/2022 10:09 AM EDT -----  Will you let her know that her PFT was normal.    ----- Message -----  From: Alexei Sandoval MD  Sent: 9/2/2022   2:59 PM EDT  To: AIDEN Browning

## 2022-09-08 ENCOUNTER — HOSPITAL ENCOUNTER (OUTPATIENT)
Dept: GENERAL RADIOLOGY | Facility: HOSPITAL | Age: 62
Discharge: HOME | End: 2022-09-08
Admitting: STUDENT IN AN ORGANIZED HEALTH CARE EDUCATION/TRAINING PROGRAM

## 2022-09-08 ENCOUNTER — OFFICE VISIT (OUTPATIENT)
Dept: NEUROSURGERY | Facility: CLINIC | Age: 62
End: 2022-09-08

## 2022-09-08 VITALS — HEIGHT: 65 IN | TEMPERATURE: 97.5 F | WEIGHT: 269.6 LBS | BODY MASS INDEX: 44.92 KG/M2

## 2022-09-08 DIAGNOSIS — M47.817 LUMBOSACRAL SPONDYLOSIS WITHOUT MYELOPATHY: ICD-10-CM

## 2022-09-08 DIAGNOSIS — M47.817 LUMBOSACRAL SPONDYLOSIS WITHOUT MYELOPATHY: Primary | ICD-10-CM

## 2022-09-08 PROCEDURE — 72114 X-RAY EXAM L-S SPINE BENDING: CPT

## 2022-09-08 PROCEDURE — 99204 OFFICE O/P NEW MOD 45 MIN: CPT | Performed by: STUDENT IN AN ORGANIZED HEALTH CARE EDUCATION/TRAINING PROGRAM

## 2022-09-08 NOTE — PROGRESS NOTES
Patient: Genie Lundberg  : 1960    Primary Care Provider: Lanny Velasquez APRN    Requesting Provider: As above      Chief Complaint: Back Pain (Left-sided), Leg Pain (BLE; hx of tumor removal from right-side abdomen left pt with right-sided numbness x 3 yrs - groin pain started - pt now has tingling and pain in the RLE), and Groin Pain (Right-sided)      History of Present Illness: This is a 62 y.o. female who presents with chronic lower back pain.  Patient describes pain in her lower back that is exacerbated by standing and walking.  She denies any significant pain radiating into her legs.  Additionally she does have posterior neck pain.  She denies any radicular pain going down her arms, but she does feel that she gets little pain in her thumb.  Additionally, she feels like her hands are weak.  She has not had any physical therapy or injections.    PMHX  Allergies:  Allergies   Allergen Reactions   • Hydrocodone Unknown (See Comments)     Check at apt time   • Sulfa Antibiotics Unknown (See Comments)     Ask at apt time     Medications    Current Outpatient Medications:   •  acetaminophen (TYLENOL) 650 MG 8 hr tablet, Take  by mouth 4 (Four) Times a Day As Needed., Disp: , Rfl:   •  albuterol sulfate  (90 Base) MCG/ACT inhaler, Inhale 2 puffs Every 4 (Four) Hours As Needed for Wheezing., Disp: 18 g, Rfl: 11  •  aspirin 81 MG EC tablet, Take 81 mg by mouth Daily., Disp: , Rfl:   •  Cyanocobalamin (VITAMIN B-12 IJ), Inject  as directed Every 30 (Thirty) Days., Disp: , Rfl:   •  estradiol (ESTRACE) 0.5 MG tablet, Take 0.5 mg by mouth Daily., Disp: , Rfl:   •  fluticasone (VERAMYST) 27.5 MCG/SPRAY nasal spray, 2 sprays into the nostril(s) as directed by provider Daily., Disp: , Rfl:   •  hydroCHLOROthiazide (MICROZIDE) 12.5 MG capsule, Take 12.5 mg by mouth Daily., Disp: , Rfl:   •  levocetirizine (XYZAL) 5 MG tablet, Take 5 mg by mouth Every Evening., Disp: , Rfl:   •  lisinopril (PRINIVIL,ZESTRIL) 40 MG  tablet, Take 40 mg by mouth Daily., Disp: , Rfl:   •  multivitamin with minerals tablet tablet, Take 1 tablet by mouth Daily., Disp: , Rfl:   •  nitroglycerin (NITROSTAT) 0.4 MG SL tablet, 1 under the tongue as needed for angina, may repeat q5mins for up three doses, Disp: 100 tablet, Rfl: 11  •  Omega-3 Fatty Acids (fish oil) 1000 MG capsule capsule, Take  by mouth Daily With Breakfast., Disp: , Rfl:   •  rosuvastatin (CRESTOR) 10 MG tablet, Take 1 tablet by mouth Daily., Disp: 30 tablet, Rfl: 11  •  vitamin D (ERGOCALCIFEROL) 1.25 MG (30717 UT) capsule capsule, Take 50,000 Units by mouth 1 (One) Time Per Week., Disp: , Rfl:   Past Medical History:  Past Medical History:   Diagnosis Date   • Arthritis    • Borderline diabetic    • Cancer (HCC)     s/p surgery   • History of hysterectomy    • Hyperlipidemia     controlled with fish oil   • Hypertension    • Sleep apnea     compliant CPAP user   • Thyroid nodule     s/p removal, benign,1980   • Vitamin D deficiency      Past Surgical History:  Past Surgical History:   Procedure Laterality Date   • CARDIOVASCULAR STRESS TEST  03/08/2022    Lexiscan- EF > 70%. Small apical Ischemia   • ECHO - CONVERTED  03/08/2022    EF 65%. Trace MR & AI. No Shunt   • HYSTERECTOMY     • OTHER SURGICAL HISTORY  01/19/2022    CT Chest- Coronary artery calcification. Hypoinflated Lungs   • OTHER SURGICAL HISTORY  02/04/2022    Pulse O2- No sig hypoxia   • THYROIDECTOMY, PARTIAL      benign nodule removed     Social Hx:  Social History     Tobacco Use   • Smoking status: Never Smoker   • Smokeless tobacco: Never Used   Vaping Use   • Vaping Use: Never used   Substance Use Topics   • Alcohol use: No   • Drug use: No     Family Hx:  Family History   Problem Relation Age of Onset   • Hypertension Mother    • Diabetes Mother    • Thyroid disease Mother    • Cancer Mother    • Arthritis Father    • Other Brother         PPM   • Heart disease Brother         CABG   • Hypertension Brother    •  Diabetes Brother    • Stroke Maternal Grandmother    • Stroke Paternal Grandmother    • No Known Problems Daughter    • No Known Problems Daughter    • Heart disease Sister    • Hypertension Brother    • Other Brother      Review of Systems:        Review of Systems   Constitutional: Negative for activity change, appetite change, chills, diaphoresis, fatigue, fever and unexpected weight change.   HENT: Positive for hearing loss. Negative for congestion, dental problem, drooling, ear discharge, ear pain, facial swelling, mouth sores, nosebleeds, postnasal drip, rhinorrhea, sinus pressure, sneezing, sore throat, tinnitus, trouble swallowing and voice change.    Eyes: Negative for photophobia, pain, discharge, redness, itching and visual disturbance.   Respiratory: Negative for apnea, cough, choking, chest tightness, shortness of breath, wheezing and stridor.    Cardiovascular: Negative for chest pain, palpitations and leg swelling.   Gastrointestinal: Negative for abdominal distention, abdominal pain, anal bleeding, blood in stool, constipation, diarrhea, nausea, rectal pain and vomiting.   Endocrine: Negative for cold intolerance, heat intolerance, polydipsia, polyphagia and polyuria.   Genitourinary: Negative for decreased urine volume, difficulty urinating, dysuria, enuresis, flank pain, frequency, genital sores, hematuria and urgency.   Musculoskeletal: Negative for arthralgias, back pain, gait problem, joint swelling, myalgias, neck pain and neck stiffness.   Skin: Negative for color change, pallor, rash and wound.   Allergic/Immunologic: Negative for environmental allergies, food allergies and immunocompromised state.   Neurological: Positive for numbness. Negative for dizziness, tremors, seizures, syncope, facial asymmetry, speech difficulty, weakness, light-headedness and headaches.   Hematological: Negative for adenopathy. Does not bruise/bleed easily.   Psychiatric/Behavioral: Negative for agitation,  "behavioral problems, confusion, decreased concentration, dysphoric mood, hallucinations, self-injury, sleep disturbance and suicidal ideas. The patient is not nervous/anxious and is not hyperactive.    All other systems reviewed and are negative.       Physical Exam:   Temp 97.5 °F (36.4 °C) (Infrared)   Ht 165.1 cm (65\")   Wt 122 kg (269 lb 9.6 oz)   BMI 44.86 kg/m²   Awake, alert and oriented x 3  Speech f/c  Opens eyes spont  Pupils 3 mm rx bilaterally  Extraocular muscles intact bilaterally  Normal sensation to light touch in all 3 distributions of CN V bilaterally  Face symmetric bilaterally  Tongue midline  5/5 in all 4 ext  Normal sensation to light touch in all 4 ext  2+DTR's  No brady's or clonus bilaterally  No pronator drift or dysmetria  Gait not assessed    Diagnostic Studies:  All neurological imaging studies were independently reviewed unless stated otherwise    Assessment/Plan:  This is a 62 y.o. female presenting with chronic lower back and neck pain.  With regards to the patient's cervical spine, her MRI shows moderate degenerative changes, most notably at C5-6.  There is stenosis around the spinal cord.  In talking the patient, nothing she has any symptoms of radiculopathy or myelopathy.  Her hands have good strength and she has no upper motor neuron signs on exam.  As such, no think there is any surgical intervention for her neck to be done.  With regards to the patient's lower back, she does have stenosis and spondylolisthesis of L4 on 5.  This is causing significant stenosis at this level.  For better evaluation, I am going to obtain flexion-extension lumbar x-rays.  I am going to refer her to physical therapy as well as pain management for injections.  I told the patient she may benefit from an L4-5 fusion, but given her current weight she is at too high of risk.  I recommended she lose approximately 50 pounds before we could discuss potential surgical intervention.  We will have the " patient follow-up with me in 3 months.    Diagnoses and all orders for this visit:    1. Lumbosacral spondylosis without myelopathy (Primary)    Other orders  -     MRI outside films; Future  -     MRI outside films; Future        Ememtt Rose MD  09/08/22  15:20 EDT

## 2022-11-16 ENCOUNTER — OFFICE VISIT (OUTPATIENT)
Dept: PULMONOLOGY | Facility: CLINIC | Age: 62
End: 2022-11-16

## 2022-11-16 VITALS
OXYGEN SATURATION: 98 % | DIASTOLIC BLOOD PRESSURE: 62 MMHG | TEMPERATURE: 98.2 F | SYSTOLIC BLOOD PRESSURE: 98 MMHG | WEIGHT: 262 LBS | BODY MASS INDEX: 43.65 KG/M2 | HEART RATE: 69 BPM | HEIGHT: 65 IN

## 2022-11-16 DIAGNOSIS — G47.30 SLEEP APNEA IN ADULT: Primary | ICD-10-CM

## 2022-11-16 DIAGNOSIS — E66.01 MORBID OBESITY WITH BMI OF 40.0-44.9, ADULT: ICD-10-CM

## 2022-11-16 DIAGNOSIS — R06.02 SHORTNESS OF BREATH: ICD-10-CM

## 2022-11-16 PROCEDURE — 99214 OFFICE O/P EST MOD 30 MIN: CPT | Performed by: NURSE PRACTITIONER

## 2022-11-16 RX ORDER — NITROFURANTOIN 25; 75 MG/1; MG/1
CAPSULE ORAL
COMMUNITY
End: 2022-11-16

## 2022-11-16 RX ORDER — TIRZEPATIDE 2.5 MG/.5ML
INJECTION, SOLUTION SUBCUTANEOUS
COMMUNITY

## 2022-11-16 NOTE — PROGRESS NOTES
"Chief Complaint  Shortness of Breath and Sleep Apnea    Subjective        Genie Lundberg presents to [facility] PULMONARY & CRITICAL CARE MEDICINE  History of Present Illness     Ms. Lundberg is a 62 year old female with a medical history significant for arthritis, hyperlipidemia,  Hypertension and sleep apnea.    She presents today for follow up on shortness of breath and sleep apnea.  She state that her shortness of breath has improved.  She is using her cpap nightly.  She is taking albuterol as needed.         Objective   Vital Signs:  BP 98/62 (BP Location: Left arm, Patient Position: Sitting)   Pulse 69   Temp 98.2 °F (36.8 °C)   Ht 165.1 cm (65\")   Wt 119 kg (262 lb)   SpO2 98%   BMI 43.60 kg/m²   Estimated body mass index is 43.6 kg/m² as calculated from the following:    Height as of this encounter: 165.1 cm (65\").    Weight as of this encounter: 119 kg (262 lb).    Class 3 Severe Obesity (BMI >=40). Obesity-related health conditions include the following: obstructive sleep apnea, hypertension and dyslipidemias. Obesity is unchanged. BMI is is above average; BMI management plan is completed. We discussed portion control and increasing exercise.      Physical Exam     /GENERAL APPEARANCE: Well developed, well nourished, alert and cooperative, and appears to be in no acute distress.    HEAD: normocephalic. Atraumatic.    EYES: PERRL, EOMI. Vision is grossly intact.    THROAT: Oral cavity and pharynx normal. No inflammation, swelling, exudate, or lesions.     NECK: Neck supple.  No thyromegaly.    CARDIAC: Normal S1 and S2. No S3, S4 or murmurs. Rhythm is regular.     RESPIRATORY:Bilateral air entry positive. Bilateral diminished breath sounds. No wheezing, crackles or rhonchi noted.    GI: Positive bowel sounds. Soft, nondistended, nontender.     MUSCULOSKELETAL: No significant deformity or joint abnormality. No edema. Peripheral pulses intact. No varicosities.    NEUROLOGICAL: Strength and " sensation symmetric and intact throughout.     PSYCHIATRIC: The mental examination revealed the patient was oriented to person, place, and time.     Result Review :  The following data was reviewed by: AIDEN Browning on 11/16/2022:           Assessment and Plan   Diagnoses and all orders for this visit:    1. Sleep apnea in adult (Primary)    2. Morbid obesity with BMI of 40.0-44.9, adult (HCC)    3. Shortness of breath          Continue albuterol every 4 hours as needed.  PFT and chest xray were noted to be normal.    She is compliant with cpap nightly.  Will obtain compliance report from DME.   Patient was educated on positive airway pressure treatment.  As per CMS guidelines, more than 4 hours on 70% of observed nights is considered adherence. Patient was strongly encouraged to use CPAP as much as possible during sleep as more CPAP use is equal to more benefit. Use of heated humidification in positive airway pressure treatment to improve the adherence to the device.  In case of claustrophobia, we will provide the patient cognitive behavioral therapy and desensitization. Oral appliances use will be discussed with the patient in case of mild to moderate sleep apnea or if the patient with severe disease fail positive airway pressure treatment.       The patient was extensively educated on the consequences of untreated obstructive sleep apnea namely cardiovascular/metabolic disorder, neurocognitive deficit, daytime sleepiness, motor vehicle accidents, depression, mood disorders and reduced quality of life.  At the end of conversation, the patient voices understanding of the disease process and treatment modality.  Patient also understands the risk of untreated obstructive sleep apnea and benefit benefits of the treatment.    Counseling time was greater than 10 minutes.            Follow Up   Return in about 1 year (around 11/16/2023).  Patient was given instructions and counseling regarding her condition  or for health maintenance advice. Please see specific information pulled into the AVS if appropriate.

## 2022-12-15 ENCOUNTER — OFFICE VISIT (OUTPATIENT)
Dept: NEUROSURGERY | Facility: CLINIC | Age: 62
End: 2022-12-15

## 2022-12-15 VITALS
TEMPERATURE: 98 F | HEIGHT: 65 IN | SYSTOLIC BLOOD PRESSURE: 104 MMHG | DIASTOLIC BLOOD PRESSURE: 68 MMHG | BODY MASS INDEX: 43.32 KG/M2 | WEIGHT: 260 LBS

## 2022-12-15 DIAGNOSIS — M47.817 LUMBOSACRAL SPONDYLOSIS WITHOUT MYELOPATHY: Primary | ICD-10-CM

## 2022-12-15 PROCEDURE — 99213 OFFICE O/P EST LOW 20 MIN: CPT | Performed by: STUDENT IN AN ORGANIZED HEALTH CARE EDUCATION/TRAINING PROGRAM

## 2022-12-15 NOTE — PROGRESS NOTES
"NEUROSURGERY PROGRESS NOTE    Chief Complaint: Back and leg pain    Subjective: This is a 62-year-old female who I previously evaluated for back and bilateral lower extremity pain.  At her last visit, I sent her for epidural injections.  She comes in today for follow-up.  She has significant improvement in her back and leg pain with injections.  She is now sleeping better throughout the night.  She is also been able to ambulate more and has began to have some moderate weight loss.  Overall, she is pleased with her progress.    Objective    Vital Signs: Blood pressure 104/68, temperature 98 °F (36.7 °C), temperature source Infrared, height 165.1 cm (65\"), weight 118 kg (260 lb).    Physical Exam  Awake, alert and oriented x 3  Opens eyes spont  Pupils 3 mm rx bilat  Extraocular muscles intact bilaterally  Face symmetric bilaterally  Tongue midline  5/5 in her LE's bilaterally    Current Medications:   Current Outpatient Medications:   •  acetaminophen (TYLENOL) 650 MG 8 hr tablet, Take  by mouth 4 (Four) Times a Day As Needed., Disp: , Rfl:   •  albuterol sulfate  (90 Base) MCG/ACT inhaler, Inhale 2 puffs Every 4 (Four) Hours As Needed for Wheezing., Disp: 18 g, Rfl: 11  •  aspirin 81 MG EC tablet, Take 81 mg by mouth Daily., Disp: , Rfl:   •  Cyanocobalamin (VITAMIN B-12 IJ), Inject  as directed Every 30 (Thirty) Days., Disp: , Rfl:   •  estradiol (ESTRACE) 0.5 MG tablet, Take 0.5 mg by mouth Daily., Disp: , Rfl:   •  fluticasone (VERAMYST) 27.5 MCG/SPRAY nasal spray, 2 sprays into the nostril(s) as directed by provider Daily., Disp: , Rfl:   •  hydroCHLOROthiazide (MICROZIDE) 12.5 MG capsule, Take 12.5 mg by mouth Daily., Disp: , Rfl:   •  levocetirizine (XYZAL) 5 MG tablet, Take 5 mg by mouth Every Evening., Disp: , Rfl:   •  lisinopril (PRINIVIL,ZESTRIL) 40 MG tablet, Take 40 mg by mouth Daily., Disp: , Rfl:   •  multivitamin with minerals tablet tablet, Take 1 tablet by mouth Daily., Disp: , Rfl:   •  " nitroglycerin (NITROSTAT) 0.4 MG SL tablet, 1 under the tongue as needed for angina, may repeat q5mins for up three doses, Disp: 100 tablet, Rfl: 11  •  Omega-3 Fatty Acids (fish oil) 1000 MG capsule capsule, Take  by mouth Daily With Breakfast., Disp: , Rfl:   •  rosuvastatin (CRESTOR) 10 MG tablet, Take 1 tablet by mouth Daily., Disp: 30 tablet, Rfl: 11  •  Tirzepatide (Mounjaro) 2.5 MG/0.5ML solution pen-injector, , Disp: , Rfl:   •  vitamin D (ERGOCALCIFEROL) 1.25 MG (62926 UT) capsule capsule, Take 50,000 Units by mouth 1 (One) Time Per Week., Disp: , Rfl:      Laboratory Results:                              Brief Urine Lab Results     None        Microbiology Results (last 10 days)     ** No results found for the last 240 hours. **          Diagnostic Imaging: I reviewed and independently interpreted the new imaging.     Assessment/Plan:  This is a 62-year-old female who I previously evaluated for back pain and bilateral lower extremity pain.  The patient has a grade 1 spondylolisthesis at L4-5 with significant stenosis.  She has found significant relief with epidural injections.  I discussed this with the patient and explained that given her improvement in leg symptoms, I do not see a role for surgical intervention at this time.  I did continue to endorse that her current weight would exclude her from being able to undergo a lumbar fusion.  We are going to write her a prescription for physical therapy today.  I told the patient to give us a call if she develops new or worsening lower extremity symptoms, but at this time, we will not schedule follow-up.    Diagnoses and all orders for this visit:    1. Lumbosacral spondylosis without myelopathy (Primary)  -     Ambulatory Referral to Physical Therapy Evaluate and treat, Neuro        Emmett Rose MD  12/15/22  11:08 EST

## 2022-12-19 DIAGNOSIS — E78.00 HYPERCHOLESTEREMIA: ICD-10-CM

## 2022-12-19 RX ORDER — ROSUVASTATIN CALCIUM 10 MG/1
TABLET, COATED ORAL
Qty: 30 TABLET | Refills: 11 | Status: SHIPPED | OUTPATIENT
Start: 2022-12-19

## 2023-02-15 ENCOUNTER — TELEPHONE (OUTPATIENT)
Dept: NEUROSURGERY | Facility: CLINIC | Age: 63
End: 2023-02-15
Payer: COMMERCIAL

## 2023-02-15 DIAGNOSIS — M47.817 LUMBOSACRAL SPONDYLOSIS WITHOUT MYELOPATHY: Primary | ICD-10-CM

## 2023-02-15 NOTE — TELEPHONE ENCOUNTER
"Provider:  Rose  Surgery/Procedure:   JIAN  Surgery/Procedure Date: NA  Last visit:   Office Visit with Emmett Rose MD (12/15/2022)  Next visit: NA     Reason for call:    \"What was the call regarding: PT CALLED AND STATES THAT SHE IS SCHEDULED FOR AN EVALUATION WITH PHYSICAL THERAPY BUT THEY ARE REQUESTING A NEW UPDATED ORDER BE SENT TO [facility] [place] FAX#  [phone].  PT STATES HER EVALUATION IS SCHEDULED FOR Monday 02/20/2023\"       PT order pending for [facility] in [place], please sign.   "

## 2023-02-15 NOTE — TELEPHONE ENCOUNTER
Caller: NELA MILIAN    Relationship: SELF    Best call back number: [phone]    What was the call regarding: PT CALLED AND STATES THAT SHE IS SCHEDULED FOR AN EVALUATION WITH PHYSICAL THERAPY BUT THEY ARE REQUESTING A NEW UPDATED ORDER BE SENT TO [facility] [place] FAX#  [phone].  PT STATES HER EVALUATION IS SCHEDULED FOR Monday 02/20/2023    Do you require a callback:     PLEASE CALL PT  THANK YOU

## 2023-05-01 ENCOUNTER — OFFICE VISIT (OUTPATIENT)
Dept: CARDIOLOGY | Facility: CLINIC | Age: 63
End: 2023-05-01
Payer: COMMERCIAL

## 2023-05-01 VITALS
HEART RATE: 88 BPM | WEIGHT: 243.6 LBS | HEIGHT: 65 IN | SYSTOLIC BLOOD PRESSURE: 138 MMHG | BODY MASS INDEX: 40.59 KG/M2 | DIASTOLIC BLOOD PRESSURE: 80 MMHG

## 2023-05-01 DIAGNOSIS — E78.00 HYPERCHOLESTEREMIA: ICD-10-CM

## 2023-05-01 DIAGNOSIS — I25.10 CORONARY ARTERY CALCIFICATION SEEN ON CAT SCAN: Primary | ICD-10-CM

## 2023-05-01 DIAGNOSIS — I10 PRIMARY HYPERTENSION: ICD-10-CM

## 2023-05-01 DIAGNOSIS — R09.02 HYPOXIA: ICD-10-CM

## 2023-05-01 DIAGNOSIS — G47.30 SLEEP APNEA IN ADULT: ICD-10-CM

## 2023-05-01 DIAGNOSIS — E88.81 METABOLIC SYNDROME: ICD-10-CM

## 2023-05-01 PROCEDURE — 99214 OFFICE O/P EST MOD 30 MIN: CPT | Performed by: NURSE PRACTITIONER

## 2023-05-01 NOTE — PROGRESS NOTES
Chief Complaint   Patient presents with   • Follow-up     Cardiac management   • Lab     Last labs in January per PCP.   • Medication     PCP stopped Crestor due to joint pain. Started 2000mg fish oil daily.   • Med Refill     PCP writes refills on medications.   • Weight loss     Watching diet close and taking Mounjaro.     Subjective       Genie Lundberg is a 62 y.o. female with HTN, metabolic syndrome referred in February 2022 after noticing increasing shortness of breath after COVID infection.  Hypoxia noted on pulse ox, O2 started.  CT chest showed hypoinflation of the lungs, mild atelectatic changes.  Incidentally noted coronary artery calcifications/atherosclerotic disease.  Crestor started for , .  Work-up with Lexiscan stress and echocardiogram with bubble study showed normal LVEF, trace MR and AI, no shunt, possible small apical ischemia.  Advised medical management with cardiac cath only if she developed chest discomfort.    She returns today for follow-up.  She denies chest pain, no significant worsening of dyspnea.  She is watching her diet much closer, taking Mounjaro and has lost a significant amount of weight.  Weight loss has improved her back pain and leg pain somewhat.  Crestor caused quite a bit of joint pain and she stopped taking.  Now taking fish oil.  BP well controlled.  Labs with Crestor 10 showed LDL improved from 101 to 55.        Cardiac History:    Past Surgical History:   Procedure Laterality Date   • CARDIOVASCULAR STRESS TEST  03/08/2022    Lexiscan- EF > 70%. Small apical Ischemia   • ECHO - CONVERTED  03/08/2022    EF 65%. Trace MR & AI. No Shunt   • HYSTERECTOMY     • OTHER SURGICAL HISTORY  01/19/2022    CT Chest- Coronary artery calcification. Hypoinflated Lungs   • OTHER SURGICAL HISTORY  02/04/2022    Pulse O2- No sig hypoxia   • THYROIDECTOMY, PARTIAL      benign nodule removed     Current Outpatient Medications   Medication Sig Dispense Refill   • acetaminophen  (TYLENOL) 650 MG 8 hr tablet Take  by mouth 4 (Four) Times a Day As Needed.     • aspirin 81 MG EC tablet Take 1 tablet by mouth Daily.     • Cyanocobalamin (VITAMIN B-12 IJ) Inject  as directed Every 30 (Thirty) Days.     • estradiol (ESTRACE) 0.5 MG tablet Take 1 tablet by mouth Daily.     • hydroCHLOROthiazide (MICROZIDE) 12.5 MG capsule Take 1 capsule by mouth Daily.     • levocetirizine (XYZAL) 5 MG tablet Take 1 tablet by mouth Daily As Needed.     • lisinopril (PRINIVIL,ZESTRIL) 40 MG tablet Take 1 tablet by mouth Daily.     • MAGNESIUM GLYCINATE PO Take 400 mg by mouth Daily.     • multivitamin with minerals tablet tablet Take 1 tablet by mouth Daily.     • nitroglycerin (NITROSTAT) 0.4 MG SL tablet 1 under the tongue as needed for angina, may repeat q5mins for up three doses 100 tablet 11   • Omega-3 Fatty Acids (fish oil) 1000 MG capsule capsule Take 2 capsules by mouth Daily With Breakfast.     • Tirzepatide (Mounjaro) 2.5 MG/0.5ML solution pen-injector Inject 1 mL under the skin into the appropriate area as directed 1 (One) Time Per Week.     • vitamin D (ERGOCALCIFEROL) 1.25 MG (48084 UT) capsule capsule Take 1 capsule by mouth 1 (One) Time Per Week.     • albuterol sulfate  (90 Base) MCG/ACT inhaler Inhale 2 puffs Every 4 (Four) Hours As Needed for Wheezing. 18 g 11     No current facility-administered medications for this visit.     Codeine, Hydrocodone, Rosuvastatin, and Sulfa antibiotics    Past Medical History:   Diagnosis Date   • Arthritis    • Borderline diabetic    • Cancer     s/p surgery   • History of hysterectomy    • Hyperlipidemia     controlled with fish oil   • Hypertension    • Sleep apnea     compliant CPAP user   • Thyroid nodule     s/p removal, benign,1980   • Vitamin D deficiency      Social History     Socioeconomic History   • Marital status:    Tobacco Use   • Smoking status: Never   • Smokeless tobacco: Never   Vaping Use   • Vaping Use: Never used   Substance  "and Sexual Activity   • Alcohol use: No   • Drug use: No   • Sexual activity: Yes     Partners: Male     Birth control/protection: None     Comment: Hysterectomy     Family History   Problem Relation Age of Onset   • Hypertension Mother    • Diabetes Mother    • Thyroid disease Mother    • Cancer Mother    • Arthritis Father    • Other Brother         PPM   • Heart disease Brother         CABG   • Hypertension Brother    • Diabetes Brother    • Stroke Maternal Grandmother    • Stroke Paternal Grandmother    • No Known Problems Daughter    • No Known Problems Daughter    • Heart disease Sister    • Hypertension Brother    • Other Brother      Review of Systems   Constitutional: Positive for weight loss (-26lb). Negative for decreased appetite and malaise/fatigue.   HENT: Negative.    Eyes: Negative for blurred vision.   Cardiovascular: Negative for chest pain, dyspnea on exertion, leg swelling, palpitations and syncope.   Respiratory: Negative for shortness of breath and sleep disturbances due to breathing.    Endocrine: Negative.    Hematologic/Lymphatic: Negative for bleeding problem. Does not bruise/bleed easily.   Skin: Negative.    Musculoskeletal: Negative for falls and myalgias.   Gastrointestinal: Negative for abdominal pain, heartburn and melena.   Genitourinary: Negative for hematuria.   Neurological: Negative for dizziness and light-headedness.   Psychiatric/Behavioral: Negative for altered mental status.   Allergic/Immunologic: Negative.       Objective     /80 (BP Location: Left arm)   Pulse 88   Ht 165.1 cm (65\")   Wt 110 kg (243 lb 9.6 oz)   BMI 40.54 kg/m²     Vitals and nursing note reviewed.   Constitutional:       General: Not in acute distress.     Appearance: Well-developed. Not diaphoretic.   Eyes:      Pupils: Pupils are equal, round, and reactive to light.   HENT:      Head: Normocephalic.   Pulmonary:      Effort: Pulmonary effort is normal. No respiratory distress.      Breath " sounds: Normal breath sounds.   Cardiovascular:      Normal rate. Regular rhythm.   Pulses:     Intact distal pulses.   Abdominal:      General: Bowel sounds are normal.      Palpations: Abdomen is soft.   Musculoskeletal: Normal range of motion.      Cervical back: Normal range of motion. Skin:     General: Skin is warm and dry.   Neurological:      Mental Status: Alert and oriented to person, place, and time.        Procedures          Problem List Items Addressed This Visit        Cardiac and Vasculature    Coronary artery calcification seen on CAT scan - Primary    Hypercholesteremia    Primary hypertension       Endocrine and Metabolic    Metabolic syndrome       Pulmonary and Pneumonias    Hypoxia       Sleep    Sleep apnea in adult      1.  Coronary artery calcification on CT- Lexiscan stress test reviewed with her showing questionable small apical ischemia.  LV function normal and septum well perfused.  She has no anginal chest pain or equivalents. Continue conservative approach.  She is taking aspirin.  Statin discontinued secondary to joint pain.  She is planning to have labs repeated with PCP.  If LDL remains greater than 70, we could try pravastatin or Livalo versus PCSK9 if we can get this prior authorized.    2.  HTN-well-controlled with lisinopril and HCTZ.  Continue the same plan.  Limit sodium.  Continue weight loss efforts.    3.  Metabolic syndrome/prediabetes- significant response to Mounjaro, tolerating well.  Continue same plan.    4.  Hyperlipidemia- labs followed by PCP, planning to recheck soon.   improved to 55 with Crestor, unfortunately she did not tolerate.  I explained the benefit of statin for plaque stabilization.  If LDL greater than 70, may try alternative statin or PCSK9.  She will request copy forwarded here.    She appears stable from a cardiac standpoint.  Follow-up in 6 months or sooner if needed.    Class 3 Severe Obesity (BMI >=40). Obesity-related health conditions  include the following: obstructive sleep apnea, hypertension, coronary heart disease, diabetes mellitus, dyslipidemias and GERD. Obesity is improving with lifestyle modifications. BMI is is above average; BMI management plan is completed. We discussed portion control and increasing exercise.               Electronically signed by AIDEN Hyman,  May 3, 2023 08:41 EDT

## 2023-05-01 NOTE — LETTER
May 3, 2023       No Recipients    Patient: Genie Lundberg   YOB: 1960   Date of Visit: 5/1/2023       Dear AIDEN Rodrigez    Genie Lundberg was in my office today. Below is a copy of my note.    If you have questions, please do not hesitate to call me. I look forward to following Genie along with you.         Sincerely,        AIDEN Hyman        CC:   No Recipients    Chief Complaint   Patient presents with   • Follow-up     Cardiac management   • Lab     Last labs in January per PCP.   • Medication     PCP stopped Crestor due to joint pain. Started 2000mg fish oil daily.   • Med Refill     PCP writes refills on medications.   • Weight loss     Watching diet close and taking Mounjaro.     Subjective       Genie Lundberg is a 62 y.o. female with HTN, metabolic syndrome referred in February 2022 after noticing increasing shortness of breath after COVID infection.  Hypoxia noted on pulse ox, O2 started.  CT chest showed hypoinflation of the lungs, mild atelectatic changes.  Incidentally noted coronary artery calcifications/atherosclerotic disease.  Crestor started for , .  Work-up with Lexiscan stress and echocardiogram with bubble study showed normal LVEF, trace MR and AI, no shunt, possible small apical ischemia.  Advised medical management with cardiac cath only if she developed chest discomfort.    She returns today for follow-up.  She denies chest pain, no significant worsening of dyspnea.  She is watching her diet much closer, taking Mounjaro and has lost a significant amount of weight.  Weight loss has improved her back pain and leg pain somewhat.  Crestor caused quite a bit of joint pain and she stopped taking.  Now taking fish oil.  BP well controlled.  Labs with Crestor 10 showed LDL improved from 101 to 55.       Cardiac History:    Past Surgical History:   Procedure Laterality Date   • CARDIOVASCULAR STRESS TEST  03/08/2022    Lexiscan- EF > 70%. Small apical Ischemia   • ECHO -  CONVERTED  03/08/2022    EF 65%. Trace MR & AI. No Shunt   • HYSTERECTOMY     • OTHER SURGICAL HISTORY  01/19/2022    CT Chest- Coronary artery calcification. Hypoinflated Lungs   • OTHER SURGICAL HISTORY  02/04/2022    Pulse O2- No sig hypoxia   • THYROIDECTOMY, PARTIAL      benign nodule removed     Current Outpatient Medications   Medication Sig Dispense Refill   • acetaminophen (TYLENOL) 650 MG 8 hr tablet Take  by mouth 4 (Four) Times a Day As Needed.     • aspirin 81 MG EC tablet Take 1 tablet by mouth Daily.     • Cyanocobalamin (VITAMIN B-12 IJ) Inject  as directed Every 30 (Thirty) Days.     • estradiol (ESTRACE) 0.5 MG tablet Take 1 tablet by mouth Daily.     • hydroCHLOROthiazide (MICROZIDE) 12.5 MG capsule Take 1 capsule by mouth Daily.     • levocetirizine (XYZAL) 5 MG tablet Take 1 tablet by mouth Daily As Needed.     • lisinopril (PRINIVIL,ZESTRIL) 40 MG tablet Take 1 tablet by mouth Daily.     • MAGNESIUM GLYCINATE PO Take 400 mg by mouth Daily.     • multivitamin with minerals tablet tablet Take 1 tablet by mouth Daily.     • nitroglycerin (NITROSTAT) 0.4 MG SL tablet 1 under the tongue as needed for angina, may repeat q5mins for up three doses 100 tablet 11   • Omega-3 Fatty Acids (fish oil) 1000 MG capsule capsule Take 2 capsules by mouth Daily With Breakfast.     • Tirzepatide (Mounjaro) 2.5 MG/0.5ML solution pen-injector Inject 1 mL under the skin into the appropriate area as directed 1 (One) Time Per Week.     • vitamin D (ERGOCALCIFEROL) 1.25 MG (58159 UT) capsule capsule Take 1 capsule by mouth 1 (One) Time Per Week.     • albuterol sulfate  (90 Base) MCG/ACT inhaler Inhale 2 puffs Every 4 (Four) Hours As Needed for Wheezing. 18 g 11     No current facility-administered medications for this visit.     Codeine, Hydrocodone, Rosuvastatin, and Sulfa antibiotics    Past Medical History:   Diagnosis Date   • Arthritis    • Borderline diabetic    • Cancer     s/p surgery   • History of  "hysterectomy    • Hyperlipidemia     controlled with fish oil   • Hypertension    • Sleep apnea     compliant CPAP user   • Thyroid nodule     s/p removal, benign,1980   • Vitamin D deficiency      Social History     Socioeconomic History   • Marital status:    Tobacco Use   • Smoking status: Never   • Smokeless tobacco: Never   Vaping Use   • Vaping Use: Never used   Substance and Sexual Activity   • Alcohol use: No   • Drug use: No   • Sexual activity: Yes     Partners: Male     Birth control/protection: None     Comment: Hysterectomy     Family History   Problem Relation Age of Onset   • Hypertension Mother    • Diabetes Mother    • Thyroid disease Mother    • Cancer Mother    • Arthritis Father    • Other Brother         PPM   • Heart disease Brother         CABG   • Hypertension Brother    • Diabetes Brother    • Stroke Maternal Grandmother    • Stroke Paternal Grandmother    • No Known Problems Daughter    • No Known Problems Daughter    • Heart disease Sister    • Hypertension Brother    • Other Brother      Review of Systems   Constitutional: Positive for weight loss (-26lb). Negative for decreased appetite and malaise/fatigue.   HENT: Negative.    Eyes: Negative for blurred vision.   Cardiovascular: Negative for chest pain, dyspnea on exertion, leg swelling, palpitations and syncope.   Respiratory: Negative for shortness of breath and sleep disturbances due to breathing.    Endocrine: Negative.    Hematologic/Lymphatic: Negative for bleeding problem. Does not bruise/bleed easily.   Skin: Negative.    Musculoskeletal: Negative for falls and myalgias.   Gastrointestinal: Negative for abdominal pain, heartburn and melena.   Genitourinary: Negative for hematuria.   Neurological: Negative for dizziness and light-headedness.   Psychiatric/Behavioral: Negative for altered mental status.   Allergic/Immunologic: Negative.       Objective      /80 (BP Location: Left arm)   Pulse 88   Ht 165.1 cm (65\")  "  Wt 110 kg (243 lb 9.6 oz)   BMI 40.54 kg/m²     Vitals and nursing note reviewed.   Constitutional:       General: Not in acute distress.     Appearance: Well-developed. Not diaphoretic.   Eyes:      Pupils: Pupils are equal, round, and reactive to light.   HENT:      Head: Normocephalic.   Pulmonary:      Effort: Pulmonary effort is normal. No respiratory distress.      Breath sounds: Normal breath sounds.   Cardiovascular:      Normal rate. Regular rhythm.   Pulses:     Intact distal pulses.   Abdominal:      General: Bowel sounds are normal.      Palpations: Abdomen is soft.   Musculoskeletal: Normal range of motion.      Cervical back: Normal range of motion. Skin:     General: Skin is warm and dry.   Neurological:      Mental Status: Alert and oriented to person, place, and time.        Procedures         Problem List Items Addressed This Visit          Cardiac and Vasculature    Coronary artery calcification seen on CAT scan - Primary    Hypercholesteremia    Primary hypertension       Endocrine and Metabolic    Metabolic syndrome       Pulmonary and Pneumonias    Hypoxia       Sleep    Sleep apnea in adult      1.  Coronary artery calcification on CT- Lexiscan stress test reviewed with her showing questionable small apical ischemia.  LV function normal and septum well perfused.  She has no anginal chest pain or equivalents. Continue conservative approach.  She is taking aspirin.  Statin discontinued secondary to joint pain.  She is planning to have labs repeated with PCP.  If LDL remains greater than 70, we could try pravastatin or Livalo versus PCSK9 if we can get this prior authorized.    2.  HTN-well-controlled with lisinopril and HCTZ.  Continue the same plan.  Limit sodium.  Continue weight loss efforts.    3.  Metabolic syndrome/prediabetes- significant response to Mounjaro, tolerating well.  Continue same plan.    4.  Hyperlipidemia- labs followed by PCP, planning to recheck soon.   improved  to 55 with Crestor, unfortunately she did not tolerate.  I explained the benefit of statin for plaque stabilization.  If LDL greater than 70, may try alternative statin or PCSK9.  She will request copy forwarded here.    She appears stable from a cardiac standpoint.  Follow-up in 6 months or sooner if needed.    Class 3 Severe Obesity (BMI >=40). Obesity-related health conditions include the following: obstructive sleep apnea, hypertension, coronary heart disease, diabetes mellitus, dyslipidemias and GERD. Obesity is improving with lifestyle modifications. BMI is is above average; BMI management plan is completed. We discussed portion control and increasing exercise.              Electronically signed by AIDEN Hyman,  May 3, 2023 08:41 EDT

## 2023-06-19 ENCOUNTER — TELEPHONE (OUTPATIENT)
Dept: CARDIOLOGY | Facility: CLINIC | Age: 63
End: 2023-06-19
Payer: COMMERCIAL

## 2023-06-19 NOTE — TELEPHONE ENCOUNTER
Fax -  [phone]     Requesting-  [facility]    Procedure-  Right Total Hip Replacement     Date of Procedure- 7/25/23    Testing-  Echo- 3/8/22  EF 65%. Trace MR & AI. No Shunt   Stress-  3/8/22   Lexiscan- EF > 70%. Small apical Ischemia     Meds-     ASA 81 mg qd

## 2023-11-20 ENCOUNTER — OFFICE VISIT (OUTPATIENT)
Dept: CARDIOLOGY | Facility: CLINIC | Age: 63
End: 2023-11-20
Payer: COMMERCIAL

## 2023-11-20 VITALS
DIASTOLIC BLOOD PRESSURE: 74 MMHG | WEIGHT: 260.8 LBS | SYSTOLIC BLOOD PRESSURE: 120 MMHG | BODY MASS INDEX: 43.45 KG/M2 | HEIGHT: 65 IN | HEART RATE: 64 BPM

## 2023-11-20 DIAGNOSIS — I10 PRIMARY HYPERTENSION: ICD-10-CM

## 2023-11-20 DIAGNOSIS — E78.00 HYPERCHOLESTEREMIA: ICD-10-CM

## 2023-11-20 DIAGNOSIS — I25.10 CORONARY ARTERY CALCIFICATION SEEN ON CAT SCAN: Primary | ICD-10-CM

## 2023-11-20 PROCEDURE — 99213 OFFICE O/P EST LOW 20 MIN: CPT | Performed by: NURSE PRACTITIONER

## 2023-11-20 RX ORDER — NIACIN 500 MG
500 TABLET ORAL DAILY
COMMUNITY

## 2023-11-20 NOTE — PROGRESS NOTES
Chief Complaint   Patient presents with    Follow-up     Cardiac management    Lab     Last labs in chart. She had total hip replacement in July, doing well.     Med Refill     PCP writes refills.     Subjective       Genie Lundberg is a 63 y.o. female with HTN, metabolic syndrome referred in February 2022 after noticing increasing shortness of breath after COVID infection.  Hypoxia noted on pulse ox, O2 started.  CT chest showed hypoinflation of the lungs, mild atelectatic changes.  Incidentally noted coronary artery calcifications/atherosclerotic disease.  Crestor started for , .  Work-up with Lexiscan stress and echocardiogram with bubble study showed normal LVEF, trace MR and AI, no shunt, possible small apical ischemia.  Advised medical management with cardiac cath only if she developed chest discomfort.  LDL improved from 101 to 55 with Crestor but she could not continue secondary to myalgia.  Now taking fish oil.     She returns today for follow-up.  She denies cardiac symptoms, no chest pain, no shortness of breath.  She underwent hip replacement in July, recovering well.  Labs followed by PCP.  Aldosterone, low cortisol, elevated renin activity, normal CMP, CBC.  A1c 5.6%.         Cardiac History:    Past Surgical History:   Procedure Laterality Date    CARDIOVASCULAR STRESS TEST  03/08/2022    Lexiscan- EF > 70%. Small apical Ischemia    ECHO - CONVERTED  03/08/2022    EF 65%. Trace MR & AI. No Shunt    HYSTERECTOMY      OTHER SURGICAL HISTORY  01/19/2022    CT Chest- Coronary artery calcification. Hypoinflated Lungs    OTHER SURGICAL HISTORY  02/04/2022    Pulse O2- No sig hypoxia    THYROIDECTOMY, PARTIAL      benign nodule removed     Current Outpatient Medications   Medication Sig Dispense Refill    acetaminophen (TYLENOL) 650 MG 8 hr tablet Take  by mouth 4 (Four) Times a Day As Needed.      aspirin 81 MG EC tablet Take 1 tablet by mouth Daily.      Cyanocobalamin (VITAMIN B-12 IJ) Inject  as  directed Every 30 (Thirty) Days.      estradiol (ESTRACE) 0.5 MG tablet Take 1 tablet by mouth Daily.      hydroCHLOROthiazide (MICROZIDE) 12.5 MG capsule Take 1 capsule by mouth Daily.      levocetirizine (XYZAL) 5 MG tablet Take 1 tablet by mouth Daily As Needed.      lisinopril (PRINIVIL,ZESTRIL) 40 MG tablet Take 1 tablet by mouth Daily.      MAGNESIUM GLYCINATE PO Take 400 mg by mouth Daily.      multivitamin with minerals tablet tablet Take 1 tablet by mouth Daily.      niacin 500 MG tablet Take 1 tablet by mouth Daily.      nitroglycerin (NITROSTAT) 0.4 MG SL tablet 1 under the tongue as needed for angina, may repeat q5mins for up three doses 100 tablet 11    Omega-3 Fatty Acids (fish oil) 1000 MG capsule capsule Take 2 capsules by mouth Daily With Breakfast.      vitamin D (ERGOCALCIFEROL) 1.25 MG (83438 UT) capsule capsule Take 1 capsule by mouth 1 (One) Time Per Week.       No current facility-administered medications for this visit.     Codeine, Hydrocodone, Rosuvastatin, and Sulfa antibiotics    Past Medical History:   Diagnosis Date    Arthritis     Borderline diabetic     Cancer     s/p surgery    Coronary artery calcification seen on CAT scan 02/01/2022    History of hysterectomy     History of total right hip replacement 07/2023    Hyperlipidemia     controlled with fish oil    Hypertension     Sleep apnea     compliant CPAP user    Thyroid nodule     s/p removal, benign,1980    Vitamin D deficiency      Social History     Socioeconomic History    Marital status:    Tobacco Use    Smoking status: Never     Passive exposure: Never    Smokeless tobacco: Never   Vaping Use    Vaping Use: Never used   Substance and Sexual Activity    Alcohol use: No    Drug use: No    Sexual activity: Yes     Partners: Male     Birth control/protection: None     Comment: Hysterectomy     Family History   Problem Relation Age of Onset    Hypertension Mother     Diabetes Mother     Thyroid disease Mother     Cancer  "Mother     Arthritis Father     Other Brother         PPM    Heart disease Brother         CABG    Hypertension Brother     Diabetes Brother     Stroke Maternal Grandmother     Stroke Paternal Grandmother     No Known Problems Daughter     No Known Problems Daughter     Heart disease Sister     Hypertension Brother     Other Brother      Review of Systems   Constitutional: Positive for weight gain (17). Negative for decreased appetite and malaise/fatigue.   HENT: Negative.     Eyes:  Negative for blurred vision.   Cardiovascular:  Negative for chest pain, dyspnea on exertion, leg swelling, palpitations and syncope.   Respiratory:  Negative for shortness of breath and sleep disturbances due to breathing.    Endocrine: Negative.    Hematologic/Lymphatic: Negative for bleeding problem. Does not bruise/bleed easily.   Skin: Negative.    Musculoskeletal:  Negative for falls and myalgias.   Gastrointestinal:  Negative for abdominal pain, heartburn and melena.   Genitourinary:  Negative for hematuria.   Neurological:  Negative for dizziness and light-headedness.   Psychiatric/Behavioral:  Negative for altered mental status.    Allergic/Immunologic: Negative.       Objective     /74 (BP Location: Left arm)   Pulse 64   Ht 165.1 cm (65\")   Wt 118 kg (260 lb 12.8 oz)   BMI 43.40 kg/m²     Vitals and nursing note reviewed.   Constitutional:       General: Not in acute distress.     Appearance: Well-developed. Not diaphoretic.   Eyes:      Pupils: Pupils are equal, round, and reactive to light.   HENT:      Head: Normocephalic.   Pulmonary:      Effort: Pulmonary effort is normal. No respiratory distress.      Breath sounds: Normal breath sounds.   Cardiovascular:      Normal rate. Regular rhythm.   Pulses:     Intact distal pulses.   Edema:     Peripheral edema absent.   Abdominal:      General: Bowel sounds are normal.      Palpations: Abdomen is soft.   Musculoskeletal: Normal range of motion.      Cervical back: " Normal range of motion. Skin:     General: Skin is warm and dry.   Neurological:      Mental Status: Alert and oriented to person, place, and time.        Procedures          Problem List Items Addressed This Visit          Cardiac and Vasculature    Coronary artery calcification seen on CAT scan - Primary    Hypercholesteremia    Primary hypertension      Coronary artery calcification on CT  -Lexiscan stress, questionable small apical ischemia  -LV function normal, septum well perfused  -She remains asymptomatic, continue to monitor symptoms and report     HTN  -well-controlled   -Continue lisinopril  lisinopril and HCTZ    Metabolic syndrome/prediabetes  -Trial of Mounjaro effective but developed GI side effects   -Continue heart healthy diet, low sugar, low carbohydrate intake     Hyperlipidemia  -LDL borderline at 101, did not tolerate Crestor  -Continue fish oil, lipids followed by PCP     She appears stable from a cardiac standpoint.      Follow-up in 6 months or sooner if needed.               Electronically signed by AIDEN Hyman,  November 22, 2023 10:55 EST

## 2023-11-30 ENCOUNTER — OFFICE VISIT (OUTPATIENT)
Dept: PULMONOLOGY | Facility: CLINIC | Age: 63
End: 2023-11-30
Payer: COMMERCIAL

## 2023-11-30 VITALS
BODY MASS INDEX: 41.65 KG/M2 | HEIGHT: 65 IN | OXYGEN SATURATION: 92 % | HEART RATE: 60 BPM | SYSTOLIC BLOOD PRESSURE: 104 MMHG | WEIGHT: 250 LBS | DIASTOLIC BLOOD PRESSURE: 62 MMHG | TEMPERATURE: 97.2 F

## 2023-11-30 DIAGNOSIS — G47.33 OSA (OBSTRUCTIVE SLEEP APNEA): Primary | ICD-10-CM

## 2023-11-30 DIAGNOSIS — R06.02 SHORTNESS OF BREATH: ICD-10-CM

## 2023-11-30 DIAGNOSIS — E66.01 MORBID OBESITY WITH BMI OF 40.0-44.9, ADULT: ICD-10-CM

## 2023-11-30 PROCEDURE — 99214 OFFICE O/P EST MOD 30 MIN: CPT | Performed by: NURSE PRACTITIONER

## 2023-11-30 NOTE — PROGRESS NOTES
"Chief Complaint  Sleep Apnea    Subjective        Genie Lundberg presents to [facility]  History of Present Illness    Ms. Lundberg is a  63 year old female with a medical history significant for arthritis, hyperlipidemia, hypertension, and sleep apnea.    She presents today for follow up on sleep apnea.  She state that she has been doing well since she was last seen.  She states that she is using her cpap nightly.  She reports that she's does feel like her oxygen drops at times.  She is using albuterol as needed but has not had to use it often.            Objective   Vital Signs:  /62 (BP Location: Left arm, Patient Position: Sitting)   Pulse 60   Temp 97.2 °F (36.2 °C)   Ht 165.1 cm (65\")   Wt 113 kg (250 lb)   SpO2 92%   BMI 41.60 kg/m²   Estimated body mass index is 41.6 kg/m² as calculated from the following:    Height as of this encounter: 165.1 cm (65\").    Weight as of this encounter: 113 kg (250 lb).               Physical Exam     GENERAL APPEARANCE: Well developed, well nourished, alert and cooperative, and appears to be in no acute distress.    HEAD: normocephalic. Atraumatic.    EYES: PERRL, EOMI. Vision is grossly intact.    THROAT: Oral cavity and pharynx normal. No inflammation, swelling, exudate, or lesions.     NECK: Neck supple.  No thyromegaly.    CARDIAC: Normal S1 and S2. No S3, S4 or murmurs. Rhythm is regular.     RESPIRATORY:Bilateral air entry positive. Bilateral diminished breath sounds. No wheezing, crackles or rhonchi noted.    GI: Positive bowel sounds. Soft, nondistended, nontender.     MUSCULOSKELETAL: No significant deformity or joint abnormality. No edema. Peripheral pulses intact. No varicosities.    NEUROLOGICAL: Strength and sensation symmetric and intact throughout.     PSYCHIATRIC: The mental examination revealed the patient was oriented to person, place, and time.     Result Review :  The following data was reviewed by: " Tanvi Figueredo, APRN on 11/30/2023:                 Assessment and Plan   Diagnoses and all orders for this visit:    1. SUZI (obstructive sleep apnea) (Primary)    2. Shortness of breath    3. Morbid obesity with BMI of 40.0-44.9, adult          Continue albuterol every 4 hours as needed.    She is compliant with use of cpap nightly.   Patient was educated on positive airway pressure treatment.  As per CMS guidelines, more than 4 hours on 70% of observed nights is considered adherence. Patient was strongly encouraged to use CPAP as much as possible during sleep as more CPAP use is equal to more benefit. Use of heated humidification in positive airway pressure treatment to improve the adherence to the device.  In case of claustrophobia, we will provide the patient cognitive behavioral therapy and desensitization. Oral appliances use will be discussed with the patient in case of mild to moderate sleep apnea or if the patient with severe disease fail positive airway pressure treatment.       The patient was extensively educated on the consequences of untreated obstructive sleep apnea namely cardiovascular/metabolic disorder, neurocognitive deficit, daytime sleepiness, motor vehicle accidents, depression, mood disorders and reduced quality of life.  At the end of conversation, the patient voices understanding of the disease process and treatment modality.  Patient also understands the risk of untreated obstructive sleep apnea and benefit benefits of the treatment.    Counseling time was greater than 10 minutes.      6 MWT was completed in office.  No oxygen desaturations were noted.           Follow Up   Return in about 6 months (around 5/30/2024).  Patient was given instructions and counseling regarding her condition or for health maintenance advice. Please see specific information pulled into the AVS if appropriate.

## 2024-02-08 ENCOUNTER — TRANSCRIBE ORDERS (OUTPATIENT)
Dept: ADMINISTRATIVE | Facility: HOSPITAL | Age: 64
End: 2024-02-08
Payer: COMMERCIAL

## 2024-02-08 ENCOUNTER — HOSPITAL ENCOUNTER (OUTPATIENT)
Dept: GENERAL RADIOLOGY | Facility: HOSPITAL | Age: 64
Discharge: HOME | End: 2024-02-08
Payer: COMMERCIAL

## 2024-02-08 DIAGNOSIS — M20.001 DEFORMITY OF FINGER OF RIGHT HAND: ICD-10-CM

## 2024-02-08 DIAGNOSIS — M25.551 RIGHT HIP PAIN: ICD-10-CM

## 2024-02-08 DIAGNOSIS — M25.552 LEFT HIP PAIN: ICD-10-CM

## 2024-02-08 DIAGNOSIS — M25.551 RIGHT HIP PAIN: Primary | ICD-10-CM

## 2024-02-08 PROCEDURE — 72190 X-RAY EXAM OF PELVIS: CPT

## 2024-02-08 PROCEDURE — 73130 X-RAY EXAM OF HAND: CPT | Performed by: RADIOLOGY

## 2024-02-08 PROCEDURE — 73130 X-RAY EXAM OF HAND: CPT

## 2024-02-08 PROCEDURE — 72190 X-RAY EXAM OF PELVIS: CPT | Performed by: RADIOLOGY

## 2024-06-10 ENCOUNTER — OFFICE VISIT (OUTPATIENT)
Dept: CARDIOLOGY | Facility: CLINIC | Age: 64
End: 2024-06-10
Payer: COMMERCIAL

## 2024-06-10 VITALS
HEIGHT: 65 IN | DIASTOLIC BLOOD PRESSURE: 70 MMHG | WEIGHT: 264.8 LBS | BODY MASS INDEX: 44.12 KG/M2 | SYSTOLIC BLOOD PRESSURE: 120 MMHG | HEART RATE: 64 BPM

## 2024-06-10 DIAGNOSIS — I25.10 CORONARY ARTERY CALCIFICATION SEEN ON CAT SCAN: ICD-10-CM

## 2024-06-10 DIAGNOSIS — I25.9: ICD-10-CM

## 2024-06-10 DIAGNOSIS — E78.00 HYPERCHOLESTEREMIA: ICD-10-CM

## 2024-06-10 DIAGNOSIS — Z78.9 STATIN INTOLERANCE: Primary | ICD-10-CM

## 2024-06-10 PROCEDURE — 99214 OFFICE O/P EST MOD 30 MIN: CPT | Performed by: NURSE PRACTITIONER

## 2024-06-10 RX ORDER — DICLOFENAC SODIUM 75 MG/1
75 TABLET, DELAYED RELEASE ORAL 2 TIMES DAILY
COMMUNITY

## 2024-06-10 NOTE — PROGRESS NOTES
Chief Complaint   Patient presents with    Follow-up     Cardiac management    Lab     Last labs in chart.    Med Refill     PCP writes refills on medications.     Subjective       Genie Lundberg is a 63 y.o. female with HTN, metabolic syndrome referred in February 2022 after noticing increasing shortness of breath after COVID infection. Hypoxia noted on pulse ox, O2 started.  CT chest showed hypoinflation of the lungs, mild atelectatic changes.  Incidentally noted coronary artery calcifications/atherosclerotic disease.  Crestor started for , .  Work-up with Lexiscan stress and echocardiogram with bubble study showed normal LVEF, trace MR and AI, no shunt, possible small apical ischemia.  Advised medical management with cardiac cath only if she developed chest discomfort.  LDL improved from 101 to 55 with Crestor but she could not continue secondary to myalgia.      She returns today for follow-up.  Cardiac symptoms denied.  No CP, SOB, palpitations.  Despite taking fish oil and niacin, lipids on 4/11/2024 have increased.  , HDL 56, , .  GFR 57, vitamin D 44, B12 738.  CBC normal, glucose 92 BUN 27/creatinine 1.09, mag 1.8.         Cardiac History:    Past Surgical History:   Procedure Laterality Date    CARDIOVASCULAR STRESS TEST  03/08/2022    Lexiscan- EF > 70%. Small apical Ischemia    ECHO - CONVERTED  03/08/2022    EF 65%. Trace MR & AI. No Shunt    HYSTERECTOMY      OTHER SURGICAL HISTORY  01/19/2022    CT Chest- Coronary artery calcification. Hypoinflated Lungs    OTHER SURGICAL HISTORY  02/04/2022    Pulse O2- No sig hypoxia    THYROIDECTOMY, PARTIAL      benign nodule removed     Current Outpatient Medications   Medication Sig Dispense Refill    acetaminophen (TYLENOL) 650 MG 8 hr tablet Take  by mouth 4 (Four) Times a Day As Needed.      aspirin 81 MG EC tablet Take 1 tablet by mouth Daily.      Cyanocobalamin (VITAMIN B-12 IJ) Inject  as directed Every 30 (Thirty) Days.       diclofenac (VOLTAREN) 75 MG EC tablet Take 1 tablet by mouth 2 (Two) Times a Day.      esomeprazole (nexIUM) 20 MG capsule As Needed.      estradiol (ESTRACE) 0.5 MG tablet Take 1 tablet by mouth Daily.      hydroCHLOROthiazide (MICROZIDE) 12.5 MG capsule Take 1 capsule by mouth Daily.      levocetirizine (XYZAL) 5 MG tablet Take 1 tablet by mouth Daily As Needed.      lisinopril (PRINIVIL,ZESTRIL) 40 MG tablet Take 1 tablet by mouth Daily.      MAGNESIUM GLYCINATE PO Take 400 mg by mouth Daily.      multivitamin with minerals tablet tablet Take 1 tablet by mouth Daily.      niacin 500 MG tablet Take 1 tablet by mouth Daily.      Omega-3 Fatty Acids (fish oil) 1000 MG capsule capsule Take 2 capsules by mouth Daily With Breakfast.      VITAMIN D PO Take 4,000 Units by mouth 1 (One) Time Per Week.      vitamin D (ERGOCALCIFEROL) 1.25 MG (97408 UT) capsule capsule Take 1 capsule by mouth 1 (One) Time Per Week.       No current facility-administered medications for this visit.     Codeine, Hydrocodone, Rosuvastatin, and Sulfa antibiotics    Past Medical History:   Diagnosis Date    Arthritis     Borderline diabetic     Cancer     s/p surgery    Coronary artery calcification seen on CAT scan 02/01/2022    History of hysterectomy     History of total right hip replacement 07/2023    Hyperlipidemia     controlled with fish oil    Hypertension     Sleep apnea     compliant CPAP user    Sleep apnea, obstructive     Thyroid nodule     s/p removal, benign,1980    Vitamin D deficiency      Social History     Socioeconomic History    Marital status:    Tobacco Use    Smoking status: Never     Passive exposure: Never    Smokeless tobacco: Never   Vaping Use    Vaping status: Never Used   Substance and Sexual Activity    Alcohol use: No    Drug use: No    Sexual activity: Yes     Partners: Male     Birth control/protection: Hysterectomy     Comment: Hysterectomy     Family History   Problem Relation Age of Onset     "Hypertension Mother     Diabetes Mother     Thyroid disease Mother     Cancer Mother     Arthritis Father     Other Brother         PPM    Heart disease Brother         CABG    Hypertension Brother     Diabetes Brother     Stroke Maternal Grandmother     Stroke Paternal Grandmother     No Known Problems Daughter     No Known Problems Daughter     Heart disease Sister     Hypertension Sister     Hypertension Brother     Other Brother      Review of Systems   Constitutional: Negative for decreased appetite and malaise/fatigue.   HENT: Negative.     Eyes:  Negative for blurred vision.   Cardiovascular:  Negative for chest pain, dyspnea on exertion, leg swelling, palpitations and syncope.   Respiratory:  Negative for shortness of breath and sleep disturbances due to breathing.    Endocrine: Negative.    Hematologic/Lymphatic: Negative for bleeding problem. Does not bruise/bleed easily.   Skin: Negative.    Musculoskeletal:  Negative for falls and myalgias.   Gastrointestinal:  Negative for abdominal pain, heartburn and melena.   Genitourinary:  Negative for hematuria.   Neurological:  Negative for dizziness and light-headedness.   Psychiatric/Behavioral:  Negative for altered mental status.    Allergic/Immunologic: Negative.       Objective     /70 (BP Location: Right arm)   Pulse 64   Ht 165.1 cm (65\")   Wt 120 kg (264 lb 12.8 oz)   BMI 44.07 kg/m²     Vitals and nursing note reviewed.   Constitutional:       General: Not in acute distress.     Appearance: Well-developed. Not diaphoretic.   Eyes:      Pupils: Pupils are equal, round, and reactive to light.   HENT:      Head: Normocephalic.   Pulmonary:      Effort: Pulmonary effort is normal. No respiratory distress.      Breath sounds: Normal breath sounds.   Cardiovascular:      Normal rate. Regular rhythm.   Pulses:     Intact distal pulses.   Edema:     Peripheral edema absent.   Abdominal:      General: Bowel sounds are normal.      Palpations: Abdomen " is soft.   Musculoskeletal: Normal range of motion.      Cervical back: Normal range of motion. Skin:     General: Skin is warm and dry.   Neurological:      Mental Status: Alert and oriented to person, place, and time.        Procedures          Problem List Items Addressed This Visit    None     Coronary artery calcification on CT  -Lexiscan stress, questionable small apical ischemia  -LV function normal, septum well perfused  -She remains asymptomatic, continue to monitor symptoms   -Continue aspirin, she is statin intolerant.  Will try Leqvio     HTN  -well-controlled   -Continue lisinopril and HCTZ     Metabolic syndrome/prediabetes  -Trial of Mounjaro effective but developed GI side effects   -Continue heart healthy diet, low sugar, low carbohydrate intake  -A1c stable at 5.9%     Hyperlipidemia  -Statin intolerant  -CT chest with coronary artery calcification, Lexiscan stress with small apical ischemia  - despite fish oil and niacin  -Refer to [facility] for Leqvio injection twice yearly if insurance approves     Follow-up in 6 months or sooner if needed.     Class 3 Severe Obesity (BMI >=40). Obesity-related health conditions include the following: obstructive sleep apnea, hypertension, coronary heart disease, diabetes mellitus, dyslipidemias, and GERD. Obesity is unchanged. BMI is is above average; BMI management plan is completed. We discussed portion control and increasing exercise.              Electronically signed by AIDEN Hyman,  Sosa 10, 2024 15:21 EDT

## 2024-06-28 ENCOUNTER — SPECIALTY PHARMACY (OUTPATIENT)
Dept: PHARMACY | Facility: HOSPITAL | Age: 64
End: 2024-06-28
Payer: COMMERCIAL

## 2024-06-28 ENCOUNTER — HOSPITAL ENCOUNTER (OUTPATIENT)
Dept: CARDIOLOGY | Facility: HOSPITAL | Age: 64
Discharge: HOME | End: 2024-06-28
Payer: COMMERCIAL

## 2024-06-28 DIAGNOSIS — E78.00 HYPERCHOLESTEREMIA: Primary | ICD-10-CM

## 2024-06-28 RX ORDER — TRIAMCINOLONE ACETONIDE 1 MG/G
1 CREAM TOPICAL 2 TIMES DAILY PRN
COMMUNITY
Start: 2024-04-04

## 2024-06-28 RX ORDER — METRONIDAZOLE 7.5 MG/G
1 GEL TOPICAL 2 TIMES DAILY
COMMUNITY
Start: 2024-04-04

## 2024-06-28 NOTE — PROGRESS NOTES
[facility]  Lipid Management Program - PCSK9i       Genie Lundberg is a 64 y.o. female referred to the [facility] by Maria Victoria Villalba for clinical pharmacy and specialty pharmacy management of PCSK9i.  Genie Lundberg is  treated for clinical hyperlipidemia and currently takes Niacin and fish oil.  In the past, Pt has tried Crestor and one other statin, but had myalgias with it. The patient denies any allergies to latex.      Original referral was for leqvio but it was not approved at this time.     Relevant Past Medical History and Comorbidities  Past Medical History:   Diagnosis Date    Arthritis     Borderline diabetic     Cancer     s/p surgery    Coronary artery calcification seen on CAT scan 02/01/2022    History of hysterectomy     History of total right hip replacement 07/2023    Hyperlipidemia     controlled with fish oil    Hypertension     Sleep apnea     compliant CPAP user    Sleep apnea, obstructive     Thyroid nodule     s/p removal, benign,1980    Vitamin D deficiency      Social History     Socioeconomic History    Marital status:    Tobacco Use    Smoking status: Never     Passive exposure: Never    Smokeless tobacco: Never   Vaping Use    Vaping status: Never Used   Substance and Sexual Activity    Alcohol use: No    Drug use: No    Sexual activity: Yes     Partners: Male     Birth control/protection: Hysterectomy     Comment: Hysterectomy       Allergies  Codeine, Hydrocodone, Rosuvastatin, and Sulfa antibiotics    Current Medication List    Current Outpatient Medications:     acetaminophen (TYLENOL) 650 MG 8 hr tablet, Take  by mouth 4 (Four) Times a Day As Needed., Disp: , Rfl:     aspirin 81 MG EC tablet, Take 1 tablet by mouth Daily., Disp: , Rfl:     Cyanocobalamin (VITAMIN B-12 IJ), Inject  as directed Every 30 (Thirty) Days., Disp: , Rfl:     diclofenac (VOLTAREN) 75 MG EC tablet, Take 1 tablet by mouth 2 (Two) Times a Day., Disp: , Rfl:     esomeprazole  (nexIUM) 20 MG capsule, As Needed., Disp: , Rfl:     estradiol (ESTRACE) 0.5 MG tablet, Take 1 tablet by mouth Daily., Disp: , Rfl:     Evolocumab (REPATHA) solution auto-injector SureClick injection, Inject 1 mL under the skin into the appropriate area as directed Every 14 (Fourteen) Days., Disp: 2 mL, Rfl: 5    hydroCHLOROthiazide (MICROZIDE) 12.5 MG capsule, Take 1 capsule by mouth Daily., Disp: , Rfl:     levocetirizine (XYZAL) 5 MG tablet, Take 1 tablet by mouth Daily As Needed., Disp: , Rfl:     lisinopril (PRINIVIL,ZESTRIL) 40 MG tablet, Take 1 tablet by mouth Daily., Disp: , Rfl:     MAGNESIUM GLYCINATE PO, Take 400 mg by mouth Daily., Disp: , Rfl:     multivitamin with minerals tablet tablet, Take 1 tablet by mouth Daily., Disp: , Rfl:     niacin 500 MG tablet, Take 1 tablet by mouth Daily., Disp: , Rfl:     Omega-3 Fatty Acids (fish oil) 1000 MG capsule capsule, Take 2 capsules by mouth Daily With Breakfast., Disp: , Rfl:     VITAMIN D PO, Take 4,000 Units by mouth 1 (One) Time Per Week., Disp: , Rfl:     Drug Interactions  None with Repatha    Relevant Laboratory Values        Initial Education Provided for Repatha    Patient seen in the Medication Management Clinic for initial education and injection training for PCSK9 inhibitors. The patient was introduced to services offered by [facility], including: regular assessments, refill coordination, curbside pick-up or mail order delivery options, prior authorization maintenance, and financial assistance programs as applicable. The patient was also provided with contact information for the pharmacy team. Welcome information and patient satisfaction survey to be sent by retail team with patient's initial fill.    Patient Instructions    Repatha is used to lower LDL, or bad cholesterol, to help reduce your chance of a heart attack or stroke.  You should give your injection once every 14 days days.  Your doctor will likely keep you on this  medication indefinitely as long as it is working for you and not causing any adverse effects.      This medication should be kept in the refrigerator until you are ready to use it.  Once removed from the refrigerator, it is good at room temperature for 30 days.  Do not leave in your car or expose to extreme heat.  Do not shake or freeze.  Dispose the used syringe/device in a sharps container.     This injection is a subcutaneous injection, which means just under the skin.  It is important to choose an area of your skin that is not tender, bruised, cut or has scars or stretch marks.  You can inject into your abdomen (except for 2 inches around your navel), your thigh or your upper arm.  Do not administer with other drugs.   Rotate injection sites each time you give the injection. Wash your hands prior to giving yourself an injection and use an alcohol wipe to clean the area of the injection and allow to dry prior to injecting.      If you miss a dose, take it as soon as you remember and resume the original schedule if it is within 7 days from the missed dose.  If an every 2 week dose is not administered within 7 days, wait until the next dose on the original schedule.  If a once-monthly dose is not administered within 7 days, administer the dose and start a new schedule based on this date.     Be sure to let your doctor or pharmacist know of any medication changes, including OTC and herbal supplements.     Adverse Effects  Reviewed with patient and education on management provided.     Sore Throat  Injection site pain  Itching or irritation   Flu-like symptoms  Signs of an allergic reaction     Immunizations  While there are no immunizations that you need to get specifically because you are on this drug, it is important to keep up with your recommended routine vaccinations.     Adherence and Self-Administration    Barriers to Patient Adherence and/or Self-Administration: None  Methods for Supporting Patient Adherence  and/or Self-Administration: None Required     Goals of Therapy  Patient Goals of Therapy: To not miss any doses  Clinical Goals or Therapeutic Targets, If Applicable: LDL Reduction      Attestation  I attest that the initiated specialty medication(s) are appropriate for the patient based on my assessment.  If the prescribed therapy is at any point deemed not appropriate based on the current or future assessments, a consultation will be initiated with the patient's specialty care provider to determine the best course of action. The revised plan of therapy will be documented along with any additional patient education provided.     Medication Assessment & Plan    Patient started today on Repatha 140mg every 14 days. Injection training and medication education provided as above.     Pt's  administered into the back of right arm and had no trouble. Patient remained on campus for 15 minutes and had no issues.     Patient should receive a lipid panel in 4-12 weeks.  Order has been placed.     Patient will continue regular follow-up with cardiology.     The patient would like to receive specialty mail-out services for the next injection. Care Coordinator to set up future refill outreaches, coordinate prescription delivery, and escalate clinical questions to pharmacist.     Will follow-up in 6 months, or sooner if needed.     Adriana Arambula RPH  6/28/2024  10:13 EDT

## 2024-07-19 ENCOUNTER — SPECIALTY PHARMACY (OUTPATIENT)
Dept: PHARMACY | Facility: HOSPITAL | Age: 64
End: 2024-07-19
Payer: COMMERCIAL

## 2024-07-19 NOTE — PROGRESS NOTES
Specialty Pharmacy Refill Coordination Note     Genie is a 64 y.o. female contacted today regarding refills of  Repatha SureClick specialty medication(s).    Reviewed and verified with patient:       Specialty medication(s) and dose(s) confirmed: yes    Refill Questions      Flowsheet Row Most Recent Value   Changes to allergies? No   Changes to medications? No   New conditions or infections since last clinic visit No   Unplanned office visit, urgent care, ED, or hospital admission in the last 4 weeks  No   How does patient/caregiver feel medication is working? Very good   Financial problems or insurance changes  No   Since the previous refill, were any specialty medication doses or scheduled injections missed or delayed?  No   Does this patient require a clinical escalation to a pharmacist? No            Delivery Questions      Flowsheet Row Most Recent Value   Copay verified? Yes   Copay amount $5   Copay form of payment Credit/debit on file                   Follow-up: 28 day(s)     Talisha Shepherd, Pharmacy Technician  Specialty Pharmacy Technician

## 2024-08-15 ENCOUNTER — SPECIALTY PHARMACY (OUTPATIENT)
Dept: PHARMACY | Facility: HOSPITAL | Age: 64
End: 2024-08-15
Payer: COMMERCIAL

## 2024-09-11 ENCOUNTER — SPECIALTY PHARMACY (OUTPATIENT)
Dept: PHARMACY | Facility: HOSPITAL | Age: 64
End: 2024-09-11
Payer: COMMERCIAL

## 2024-12-16 ENCOUNTER — OFFICE VISIT (OUTPATIENT)
Dept: CARDIOLOGY | Facility: CLINIC | Age: 64
End: 2024-12-16
Payer: COMMERCIAL

## 2024-12-16 VITALS
BODY MASS INDEX: 43.89 KG/M2 | HEIGHT: 65 IN | HEART RATE: 60 BPM | SYSTOLIC BLOOD PRESSURE: 110 MMHG | WEIGHT: 263.4 LBS | DIASTOLIC BLOOD PRESSURE: 60 MMHG

## 2024-12-16 DIAGNOSIS — E78.00 HYPERCHOLESTEREMIA: ICD-10-CM

## 2024-12-16 DIAGNOSIS — I10 PRIMARY HYPERTENSION: ICD-10-CM

## 2024-12-16 DIAGNOSIS — I25.10 CORONARY ARTERY CALCIFICATION SEEN ON CAT SCAN: Primary | ICD-10-CM

## 2024-12-16 PROCEDURE — 99214 OFFICE O/P EST MOD 30 MIN: CPT | Performed by: NURSE PRACTITIONER

## 2024-12-16 NOTE — PROGRESS NOTES
Chief Complaint   Patient presents with    Follow-up     Cardiac management    Lab     Last labs 12/4/24, has copy of most recent labs.    Med Refill     Does not need refills at this time.     Medication     Was unable to take Repatha, caused joint pain.     Subjective       Genie Lundberg is a 64 y.o. female with HTN, metabolic syndrome referred in February 2022 after noticing increasing shortness of breath after COVID infection. Hypoxia noted on pulse ox, O2 started.  CT chest showed hypoinflation of the lungs, mild atelectatic changes.  Incidentally noted coronary artery calcifications/atherosclerotic disease.  Crestor started for , .  Work-up with Lexiscan stress and echocardiogram with bubble study showed normal LVEF, trace MR and AI, no shunt, possible small apical ischemia.  Advised medical management with cardiac cath only if she developed chest discomfort.  LDL improved from 101 to 55 with Crestor but she could not continue secondary to myalgia.  LDL increased to 123, Repatha prescribed.  LDL improved to 44     She returns today for follow-up.  Cardiac symptoms denied.  No CP, SOB, palpitations.  She did not tolerate Repatha secondary to muscle and joint pain which progressively worsened over 3 to 4 months.  Symptoms improved after stopping.  She does have left hip pain, walking with a cane today.  Labs on 12/4/2024: , , HDL 51, , CMP normal, TSH 1.  2 7, B12 1028.  A1c 5.8%.       Cardiac History:    Past Surgical History:   Procedure Laterality Date    CARDIOVASCULAR STRESS TEST  03/08/2022    Lexiscan- EF > 70%. Small apical Ischemia    ECHO - CONVERTED  03/08/2022    EF 65%. Trace MR & AI. No Shunt    HYSTERECTOMY      OTHER SURGICAL HISTORY  01/19/2022    CT Chest- Coronary artery calcification. Hypoinflated Lungs    OTHER SURGICAL HISTORY  02/04/2022    Pulse O2- No sig hypoxia    THYROIDECTOMY, PARTIAL      benign nodule removed     Current Outpatient Medications    Medication Sig Dispense Refill    APPLE CIDER VINEGAR PO Take 2 tablets by mouth 2 (Two) Times a Day.      aspirin 81 MG EC tablet Take 1 tablet by mouth Daily.      Cyanocobalamin (VITAMIN B-12 IJ) Inject  as directed Every 30 (Thirty) Days.      diclofenac (VOLTAREN) 75 MG EC tablet Take 1 tablet by mouth 2 (Two) Times a Day.      esomeprazole (nexIUM) 20 MG capsule As Needed.      estradiol (ESTRACE) 0.5 MG tablet Take 1 tablet by mouth Daily.      hydroCHLOROthiazide (MICROZIDE) 12.5 MG capsule Take 1 capsule by mouth Daily.      levocetirizine (XYZAL) 5 MG tablet Take 1 tablet by mouth Daily As Needed.      lisinopril (PRINIVIL,ZESTRIL) 40 MG tablet Take 1 tablet by mouth Daily.      MAGNESIUM GLYCINATE PO Take 400 mg by mouth Daily.      metroNIDAZOLE (METROGEL) 0.75 % gel Apply 1 Application topically to the appropriate area as directed 2 (Two) Times a Day. prn      multivitamin with minerals tablet tablet Take 1 tablet by mouth Daily.      niacin 500 MG tablet Take 1 tablet by mouth Daily.      Omega-3 Fatty Acids (fish oil) 1000 MG capsule capsule Take 2 capsules by mouth Daily With Breakfast.      triamcinolone (KENALOG) 0.1 % cream Apply 1 Application topically to the appropriate area as directed 2 (Two) Times a Day As Needed.      VITAMIN D PO Take 4,000 Units by mouth 1 (One) Time Per Week.      Evolocumab (REPATHA) solution auto-injector SureClick injection Inject 1 mL under the skin into the appropriate area as directed Every 14 (Fourteen) Days. 2 mL 5     No current facility-administered medications for this visit.     Codeine, Hydrocodone, Repatha [evolocumab], Rosuvastatin, and Sulfa antibiotics    Past Medical History:   Diagnosis Date    Arthritis     Borderline diabetic     Cancer     s/p surgery    Coronary artery calcification seen on CAT scan 02/01/2022    History of hysterectomy     History of total right hip replacement 07/2023    Hyperlipidemia     controlled with fish oil     Hypertension     Sleep apnea     compliant CPAP user    Sleep apnea, obstructive     Thyroid nodule     s/p removal, benign,1980    Vitamin D deficiency      Social History     Socioeconomic History    Marital status:    Tobacco Use    Smoking status: Never     Passive exposure: Never    Smokeless tobacco: Never   Vaping Use    Vaping status: Never Used   Substance and Sexual Activity    Alcohol use: No    Drug use: No    Sexual activity: Yes     Partners: Male     Birth control/protection: Hysterectomy     Comment: Hysterectomy     Family History   Problem Relation Age of Onset    Hypertension Mother     Diabetes Mother     Thyroid disease Mother     Cancer Mother     Arthritis Father     Heart disease Brother         CABG    Hypertension Brother     Diabetes Brother     Stroke Maternal Grandmother     Stroke Paternal Grandmother     No Known Problems Daughter     No Known Problems Daughter     Heart disease Sister     Hypertension Sister     Hypertension Brother      Review of Systems   Constitutional: Positive for weight loss (-1). Negative for decreased appetite and malaise/fatigue.   HENT: Negative.     Eyes:  Negative for blurred vision.   Cardiovascular:  Negative for chest pain, dyspnea on exertion, leg swelling, palpitations and syncope.   Respiratory:  Negative for shortness of breath and sleep disturbances due to breathing.    Endocrine: Negative.    Hematologic/Lymphatic: Negative for bleeding problem. Does not bruise/bleed easily.   Skin: Negative.    Musculoskeletal:  Positive for arthritis and joint pain. Negative for falls and myalgias.   Gastrointestinal:  Negative for abdominal pain, heartburn and melena.   Genitourinary:  Negative for hematuria.   Neurological:  Negative for dizziness and light-headedness.   Psychiatric/Behavioral:  Negative for altered mental status.    Allergic/Immunologic: Negative.       Objective     /60 (BP Location: Right arm, Patient Position: Sitting)    "Pulse 60   Ht 165.1 cm (65\")   Wt 119 kg (263 lb 6.4 oz)   BMI 43.83 kg/m²     Vitals and nursing note reviewed.   Constitutional:       General: Not in acute distress.     Appearance: Well-developed. Not diaphoretic.   Eyes:      Pupils: Pupils are equal, round, and reactive to light.   HENT:      Head: Normocephalic.   Pulmonary:      Effort: Pulmonary effort is normal. No respiratory distress.      Breath sounds: Normal breath sounds.   Cardiovascular:      Normal rate. Regular rhythm.   Pulses:     Intact distal pulses.   Edema:     Peripheral edema absent.   Abdominal:      General: Bowel sounds are normal.      Palpations: Abdomen is soft.   Musculoskeletal: Normal range of motion.      Cervical back: Normal range of motion. Skin:     General: Skin is warm and dry.   Neurological:      Mental Status: Alert and oriented to person, place, and time.        Procedures          Problem List Items Addressed This Visit          Cardiac and Vasculature    Coronary artery calcification seen on CAT scan - Primary    Hypercholesteremia    Primary hypertension      Coronary artery calcification on CT  -Lexiscan stress, questionable small apical ischemia  -LV function normal, septum well perfused  -She remains asymptomatic, continue to monitor symptoms   -Continue aspirin, she is statin and PCSK9 intolerant  -Nonpharmacologic strategies to lower cholesterol reviewed including red yeast rice, Krill oil, apple cider vinegar and garlic, reducing sugar and carbohydrates     HTN  -well-controlled   -Continue lisinopril and HCTZ     Metabolic syndrome/prediabetes  -Trial of Mounjaro effective but developed GI side effects   -Continue heart healthy diet, low sugar, low carbohydrate intake  -A1c stable at 5.8%     Hyperlipidemia  -Statin intolerant  -CT chest with coronary artery calcification, Lexiscan stress with small apical ischemia  -Repatha not tolerated     Follow-up in 6 months or sooner if needed.            "   Electronically signed by AIDEN Hyman,  December 16, 2024 17:00 EST

## 2025-01-02 ENCOUNTER — TELEPHONE (OUTPATIENT)
Dept: PHARMACY | Facility: HOSPITAL | Age: 65
End: 2025-01-02
Payer: COMMERCIAL

## 2025-01-02 NOTE — TELEPHONE ENCOUNTER
Called patient to see if she is going to continue Repatha. She states that she does not want to start back on Repatha at this time. She was having a lot of joint pain with it. Cardiology is aware per Maria Victoria Villalba's note on 12/16/24. Will discharge patient from clinic.    Thank you,    Alice Rajan, PharmD  1/2/2025  13:30 EST

## 2025-04-28 ENCOUNTER — TELEPHONE (OUTPATIENT)
Dept: CARDIOLOGY | Facility: CLINIC | Age: 65
End: 2025-04-28
Payer: COMMERCIAL

## 2025-04-28 NOTE — TELEPHONE ENCOUNTER
Received fax from Dr. Dinh for cardiac clearance for patient to have a total hip reaplcement. Patient is on aspirin, unclear if needing to hold. According to our records, I do not see where patient has had any stenting.          Fax [phone]